# Patient Record
Sex: MALE | Race: WHITE | Employment: FULL TIME | URBAN - METROPOLITAN AREA
[De-identification: names, ages, dates, MRNs, and addresses within clinical notes are randomized per-mention and may not be internally consistent; named-entity substitution may affect disease eponyms.]

---

## 2019-08-09 ENCOUNTER — TELEPHONE (OUTPATIENT)
Dept: GASTROENTEROLOGY | Age: 59
End: 2019-08-09

## 2019-08-09 DIAGNOSIS — Z12.11 COLON CANCER SCREENING: Primary | ICD-10-CM

## 2019-08-09 RX ORDER — SODIUM, POTASSIUM,MAG SULFATES 17.5-3.13G
SOLUTION, RECONSTITUTED, ORAL ORAL
Qty: 2 BOTTLE | Refills: 0 | Status: ON HOLD | OUTPATIENT
Start: 2019-08-09 | End: 2019-08-26 | Stop reason: HOSPADM

## 2019-08-19 RX ORDER — GLIMEPIRIDE 4 MG/1
4 TABLET ORAL 2 TIMES DAILY
COMMUNITY

## 2019-08-19 RX ORDER — AMLODIPINE BESYLATE 5 MG/1
5 TABLET ORAL DAILY
COMMUNITY

## 2019-08-19 RX ORDER — IBUPROFEN 800 MG/1
800 TABLET ORAL EVERY 6 HOURS PRN
COMMUNITY

## 2019-08-19 RX ORDER — LOSARTAN POTASSIUM 100 MG/1
100 TABLET ORAL DAILY
COMMUNITY

## 2019-08-19 RX ORDER — SIMVASTATIN 20 MG
20 TABLET ORAL DAILY
COMMUNITY

## 2019-08-26 ENCOUNTER — HOSPITAL ENCOUNTER (OUTPATIENT)
Age: 59
Setting detail: OUTPATIENT SURGERY
Discharge: HOME | End: 2019-08-26
Attending: INTERNAL MEDICINE | Admitting: INTERNAL MEDICINE
Payer: COMMERCIAL

## 2019-08-26 ENCOUNTER — ANESTHESIA (OUTPATIENT)
Dept: OPERATING ROOM | Age: 59
End: 2019-08-26
Payer: COMMERCIAL

## 2019-08-26 ENCOUNTER — ANESTHESIA EVENT (OUTPATIENT)
Dept: OPERATING ROOM | Age: 59
End: 2019-08-26
Payer: COMMERCIAL

## 2019-08-26 VITALS
SYSTOLIC BLOOD PRESSURE: 103 MMHG | OXYGEN SATURATION: 99 % | DIASTOLIC BLOOD PRESSURE: 60 MMHG | RESPIRATION RATE: 17 BRPM

## 2019-08-26 VITALS
DIASTOLIC BLOOD PRESSURE: 85 MMHG | TEMPERATURE: 97.6 F | OXYGEN SATURATION: 100 % | SYSTOLIC BLOOD PRESSURE: 127 MMHG | HEART RATE: 76 BPM | RESPIRATION RATE: 16 BRPM | BODY MASS INDEX: 35.03 KG/M2 | HEIGHT: 66 IN | WEIGHT: 218 LBS

## 2019-08-26 LAB — GLUCOSE BLD-MCNC: 218 MG/DL (ref 74–100)

## 2019-08-26 PROCEDURE — 2709999900 HC NON-CHARGEABLE SUPPLY: Performed by: INTERNAL MEDICINE

## 2019-08-26 PROCEDURE — 88305 TISSUE EXAM BY PATHOLOGIST: CPT

## 2019-08-26 PROCEDURE — 3700000000 HC ANESTHESIA ATTENDED CARE: Performed by: INTERNAL MEDICINE

## 2019-08-26 PROCEDURE — 45385 COLONOSCOPY W/LESION REMOVAL: CPT | Performed by: INTERNAL MEDICINE

## 2019-08-26 PROCEDURE — 82947 ASSAY GLUCOSE BLOOD QUANT: CPT

## 2019-08-26 PROCEDURE — 7100000010 HC PHASE II RECOVERY - FIRST 15 MIN: Performed by: INTERNAL MEDICINE

## 2019-08-26 PROCEDURE — 2720000010 HC SURG SUPPLY STERILE: Performed by: INTERNAL MEDICINE

## 2019-08-26 PROCEDURE — 3700000001 HC ADD 15 MINUTES (ANESTHESIA): Performed by: INTERNAL MEDICINE

## 2019-08-26 PROCEDURE — 2580000003 HC RX 258: Performed by: INTERNAL MEDICINE

## 2019-08-26 PROCEDURE — 7100000011 HC PHASE II RECOVERY - ADDTL 15 MIN: Performed by: INTERNAL MEDICINE

## 2019-08-26 PROCEDURE — 6360000002 HC RX W HCPCS: Performed by: NURSE ANESTHETIST, CERTIFIED REGISTERED

## 2019-08-26 PROCEDURE — 3609010600 HC COLONOSCOPY POLYPECTOMY SNARE/COLD BIOPSY: Performed by: INTERNAL MEDICINE

## 2019-08-26 PROCEDURE — 2500000003 HC RX 250 WO HCPCS: Performed by: NURSE ANESTHETIST, CERTIFIED REGISTERED

## 2019-08-26 RX ORDER — EPHEDRINE SULFATE/0.9% NACL/PF 50 MG/5 ML
SYRINGE (ML) INTRAVENOUS PRN
Status: DISCONTINUED | OUTPATIENT
Start: 2019-08-26 | End: 2019-08-26 | Stop reason: SDUPTHER

## 2019-08-26 RX ORDER — PROPOFOL 10 MG/ML
INJECTION, EMULSION INTRAVENOUS CONTINUOUS PRN
Status: DISCONTINUED | OUTPATIENT
Start: 2019-08-26 | End: 2019-08-26 | Stop reason: SDUPTHER

## 2019-08-26 RX ORDER — PROPOFOL 10 MG/ML
INJECTION, EMULSION INTRAVENOUS PRN
Status: DISCONTINUED | OUTPATIENT
Start: 2019-08-26 | End: 2019-08-26 | Stop reason: SDUPTHER

## 2019-08-26 RX ORDER — SODIUM CHLORIDE, SODIUM LACTATE, POTASSIUM CHLORIDE, CALCIUM CHLORIDE 600; 310; 30; 20 MG/100ML; MG/100ML; MG/100ML; MG/100ML
INJECTION, SOLUTION INTRAVENOUS CONTINUOUS
Status: DISCONTINUED | OUTPATIENT
Start: 2019-08-26 | End: 2019-08-26 | Stop reason: HOSPADM

## 2019-08-26 RX ORDER — LIDOCAINE HYDROCHLORIDE 20 MG/ML
INJECTION, SOLUTION INFILTRATION; PERINEURAL PRN
Status: DISCONTINUED | OUTPATIENT
Start: 2019-08-26 | End: 2019-08-26 | Stop reason: SDUPTHER

## 2019-08-26 RX ADMIN — PHENYLEPHRINE HYDROCHLORIDE 100 MCG: 10 INJECTION INTRAVENOUS at 14:29

## 2019-08-26 RX ADMIN — PROPOFOL 250 MCG/KG/MIN: 10 INJECTION, EMULSION INTRAVENOUS at 14:16

## 2019-08-26 RX ADMIN — PHENYLEPHRINE HYDROCHLORIDE 50 MCG: 10 INJECTION INTRAVENOUS at 14:23

## 2019-08-26 RX ADMIN — PROPOFOL 50 MG: 10 INJECTION, EMULSION INTRAVENOUS at 14:52

## 2019-08-26 RX ADMIN — PHENYLEPHRINE HYDROCHLORIDE 100 MCG: 10 INJECTION INTRAVENOUS at 14:26

## 2019-08-26 RX ADMIN — PHENYLEPHRINE HYDROCHLORIDE 50 MCG: 10 INJECTION INTRAVENOUS at 14:22

## 2019-08-26 RX ADMIN — Medication 10 MG: at 14:54

## 2019-08-26 RX ADMIN — LIDOCAINE HYDROCHLORIDE 5 ML: 20 INJECTION, SOLUTION INFILTRATION; PERINEURAL at 14:16

## 2019-08-26 RX ADMIN — PHENYLEPHRINE HYDROCHLORIDE 100 MCG: 10 INJECTION INTRAVENOUS at 14:33

## 2019-08-26 RX ADMIN — Medication 10 MG: at 15:04

## 2019-08-26 RX ADMIN — PROPOFOL 50 MG: 10 INJECTION, EMULSION INTRAVENOUS at 14:49

## 2019-08-26 RX ADMIN — Medication 15 MG: at 14:39

## 2019-08-26 RX ADMIN — PROPOFOL 50 MG: 10 INJECTION, EMULSION INTRAVENOUS at 14:55

## 2019-08-26 RX ADMIN — Medication 10 MG: at 14:43

## 2019-08-26 RX ADMIN — SODIUM CHLORIDE, POTASSIUM CHLORIDE, SODIUM LACTATE AND CALCIUM CHLORIDE: 600; 310; 30; 20 INJECTION, SOLUTION INTRAVENOUS at 13:44

## 2019-08-26 ASSESSMENT — PAIN - FUNCTIONAL ASSESSMENT: PAIN_FUNCTIONAL_ASSESSMENT: 0-10

## 2019-08-26 ASSESSMENT — PAIN SCALES - GENERAL
PAINLEVEL_OUTOF10: 0
PAINLEVEL_OUTOF10: 0

## 2019-08-26 ASSESSMENT — LIFESTYLE VARIABLES: SMOKING_STATUS: 0

## 2019-08-26 ASSESSMENT — COPD QUESTIONNAIRES: CAT_SEVERITY: MILD

## 2019-08-26 NOTE — ANESTHESIA PRE PROCEDURE
current smoker                           Cardiovascular:  Exercise tolerance: good (>4 METS),   (+) hypertension:, hyperlipidemia        Rhythm: regular  Rate: normal           Beta Blocker:  Not on Beta Blocker         Neuro/Psych:                ROS comment: Hx of neck surgery  GI/Hepatic/Renal:   (+) GERD: poorly controlled, bowel prep,          ROS comment: Colon cancer screening . Endo/Other:    (+) DiabetesType II DM, , : arthritis (ankles, knees,): OA., .                 Abdominal:   (+) obese,     Abdomen: soft. Vascular: negative vascular ROS. Anesthesia Plan      general and TIVA     ASA 2       Induction: intravenous. Anesthetic plan and risks discussed with patient and spouse.                       JOHN Dye - CRNA   8/26/2019

## 2019-08-26 NOTE — OP NOTE
PROCEDURE NOTE    DATE OF PROCEDURE: 8/26/2019    ENDOSCOPIST: Lashonda Kauffman. Dayna Cabrera MD, Chikis Croft    ASSISTANT: None    PREOPERATIVE DIAGNOSIS: screening for colon cancer    POSTOPERATIVE DIAGNOSIS: polyp(s) #1, 13 mm in size, located in the sigmoid removed with snare cautery after prophylactic clipping of the stalk and recovered for histology with a Feng net  #2, 3 mm in size, located in the sigmoid removed by cold snare and retrieved for pathology  #3, 5-6 mm in size, located in the sigmoid removed by snare cautery and retrieved for pathology    OPERATION: Colonoscopy with polypectomy (cold snare), polypectomy (snare cautery)    ANESTHESIA: MAC     ESTIMATED BLOOD LOSS: Minimal    COMPLICATIONS: None. SPECIMENS: were obtained    HISTORY: The patient is a 62y.o. year old male with history of above preop diagnosis. Colonoscopy with possible biopsy or polypectomy has been recommended and I explained the risk, benefits, expected outcome, and alternatives to the procedure. Risks include but are not limited to bleeding, infection, respiratory distress, hypotension, and perforation of the colon. The patient understands and is in agreement. PROCEDURE: The patient was given monitored anesthesia care. The patient was given oxygen by nasal cannula. The colonoscope was inserted per rectum and advanced under direct vision to the cecum without difficulty. Findings:  Cecum/Ascending colon: normal    Transverse colon: normal    Descending/Sigmoid colon: abnormal: Polyps (2) removed with snare cautery and recovered for histology with the larger one requiring a Feng net, smaller polyp removed with cold snare and recovered for histology    Rectum/Anus: examined in normal and retroflexed positions and was normal    The colon was decompressed and the scope was removed. The patient tolerated the procedure well.        Electronically signed by Lor Muhammad MD  on 8/26/2019 at 3:11 PM

## 2019-08-28 LAB — SURGICAL PATHOLOGY REPORT: NORMAL

## 2019-09-22 PROBLEM — Z12.11 COLON CANCER SCREENING: Status: RESOLVED | Noted: 2019-08-09 | Resolved: 2019-09-22

## 2020-06-18 ENCOUNTER — HOSPITAL ENCOUNTER (OUTPATIENT)
Age: 60
Setting detail: SPECIMEN
Discharge: HOME | End: 2020-06-18
Payer: COMMERCIAL

## 2020-06-18 PROCEDURE — U0003 INFECTIOUS AGENT DETECTION BY NUCLEIC ACID (DNA OR RNA); SEVERE ACUTE RESPIRATORY SYNDROME CORONAVIRUS 2 (SARS-COV-2) (CORONAVIRUS DISEASE [COVID-19]), AMPLIFIED PROBE TECHNIQUE, MAKING USE OF HIGH THROUGHPUT TECHNOLOGIES AS DESCRIBED BY CMS-2020-01-R: HCPCS

## 2020-06-22 LAB — SARS-COV-2, NAA: NOT DETECTED

## 2020-06-23 ENCOUNTER — TELEPHONE (OUTPATIENT)
Dept: PRIMARY CARE CLINIC | Age: 60
End: 2020-06-23

## 2021-07-01 ENCOUNTER — HOSPITAL ENCOUNTER (OUTPATIENT)
Dept: DIABETES SERVICES | Age: 61
Setting detail: THERAPIES SERIES
Discharge: HOME | End: 2021-07-01
Payer: COMMERCIAL

## 2021-07-01 PROCEDURE — G0108 DIAB MANAGE TRN  PER INDIV: HCPCS

## 2021-07-01 SDOH — ECONOMIC STABILITY: FOOD INSECURITY: ADDITIONAL INFORMATION: NO

## 2021-07-01 NOTE — PROGRESS NOTES
Highly encourage milk consumption to be decreased. Consider a glass (8 oz) with supper and could include another at 8 oz at bedtime. He does the shopping and wife does the cooking. He describes his job as being active but does not participate in planned physical activity. Discuss benefits of exercise and identify barriers. Offer You Tube as an option for exercises, walking, swimming etc.  Encourage them to perform this together for support and accountability. No particular exercise selected at this time but will consider adding this in. Encourage him to not sit for more than 60 minutes. Discuss calling and scheduling eye exam since he has not had one since 2019. He and wife are willing to attend group education classes and will schedule dietitian appointment. All questions answered and office number is given to call with questions or concerns.     Participant Name: Mir Pierre   Referring Provider:  Alex Hauser MD    Keys to learning:  Considerations: []Language []Emotional []Health Literacy  []Cognitive []Memory changes []Financial []Cultural   []Congregation []Vision []Hearing  []Speech []Lack of desire  []Literacy  []Psycho-social  [x]None [] Covid-19 group restrictions  If considerations are noted, accommodations made:     Identified barriers to learning/self management: works full time    The following information was discussed:    [] Diabetes disease process and treatment options   [x] Healthy nutrition, carbohydrate counting, meal planning  [x] Monitoring blood glucose and other parameters; interpreting and using results  [] Acute complications--prevention, detection and treatment  [] Medication management and safety   [x] Incorporating physical activity into lifestyle   [x] Exercise for Health, Reducing Risks for Heart Disease, Diabetes and Heart Health  [] Preventing, through risk reduction behaviors, detecting, and treating chronic complications  [] Sick Day management  [x] Developing [x] Good [] Fair  [] Poor                  Session  Topics & Learning Objectives:      Comments:   Diabetes disease process & Treatment process: Define diabetes & prediabetes; identify own type of diabetes; role of the pancreas; signs/symptoms; diagnostic criteria; prevention and treatment options; contributing factors. Rating  [x] 1  [] 2  [] 3  [] 4      [] NC  [] N/A   Rating  [] 1  [] 2  [] 3  [] 4  [] NC  [] N/A     Rating  [] 1  [] 2  [] 3  [] 4  [] NC  [] N/A     Rating  [] 1  [] 2  [] 3  [] 4  [] NC  [] N/A           Incorporating nutritional management into lifestyle: Describe effect of type, amount & timing of food on blood glucose; Describe basic meal planning techniques & current nutrition guidelines; Correctly read food labels & demonstrate CHO counting & portion control with personalized meal plan. Rating  [x] 1  [] 2  [] 3  [] 4  [] NC  [] N/A     Rating  [] 1  [] 2  [] 3  [] 4  [] NC  [] N/A     Rating  [] 1  [] 2  [] 3  [] 4  [] NC  [] N/A     Rating  [] 1  [] 2  [] 3  [] 4  [] NC  [] N/A                 Incorporating physical activity into lifestyle:   State effect of exercise on blood glucose levels. Identifies personal exercise plan and contraindications. Discussed safety tips while exercising. Rating  [x] 1  [] 2  [] 3  [] 4  [] NC  [] N/A     Rating  [] 1  [] 2  [] 3  [] 4  [] NC  [] N/A       Rating  [] 1  [] 2  [] 3  [] 4  [] NC  [] N/A     Rating  [] 1  [] 2  [] 3  [] 4  [] NC  [] N/A           Using medications safely: State effect of diabetes medicines on diabetes;   Name diabetes medication taking, action, timing & side effects.    Rating  [x] 1  [] 2  [] 3  [] 4  [] NC  [] N/A       Rating  [] 1  [] 2  [] 3  [] 4  [] NC  [] N/A         Rating  [] 1  [] 2  [] 3  [] 4  [] NC  [] N/A   Rating  [] 1  [] 2  [] 3  [] 4  [] NC  [] N/A      ________             Insulin/injectable-  Appropriate injection site; proper storage; proper technique; safe needle disposal.   Rating  [x] 1  [] 2  [] 3  [] 4  [] NC  [] N/A Rating  [] 1  [] 2  [] 3  [] 4  [] NC  [] N/A Rating  [] 1  [] 2  [] 3  [] 4  [] NC  [] N/A Rating  [] 1  [] 2  [] 3  [] 4  [] NC  [] N/A        Monitoring blood glucose, interpreting and using results: Identify recommended blood glucose targets, personal targets, A1C target, importance of logging glucose,appropriate techniques and problem solving. Safe lancet disposal.    Rating  [x] 1  [] 2  [] 3  [] 4  [] NC  [] N/A     Rating  [] 1  [] 2  [] 3  [] 4  [] NC  [] N/A       Rating  [] 1  [] 2  [] 3  [] 4  [] NC  [] N/A     Rating  [] 1  [] 2  [] 3  [] 4  [] NC  [] N/A         Prevention, detection & treatment of acute complications: List symptoms of hyper- and hypoglycemia; Describe how to treat low blood sugar & actions for lowering high blood glucose levels. Prevention and treatment strategies. Rating  [x] 1  [] 2  [] 3  [] 4  [] NC  [] N/A   Rating  [] 1  [] 2  [] 3  [] 4  [] NC  [] N/A   Rating  [] 1  [] 2  [] 3  [] 4  [] NC  [] N/A Rating  [] 1  [] 2  [] 3  [] 4  [] NC  [] N/A                   Describe sick day guidelines and indications for physician notification. Rating  [x] 1  [] 2  [] 3  [] 4  [] NC  [] N/A     Rating  [] 1  [] 2  [] 3  [] 4  [] NC  [] N/A     Rating  [] 1  [] 2  [] 3  [] 4  [] NC  [] N/A     Rating  [] 1  [] 2  [] 3  [] 4  [] NC  [] N/A        Prevention, detection & treatment of chronic complications: Define the natural course of diabetes & describe the relationship of blood glucose levels to long term complications of diabetes. Identify preventative measures and standard of care. Rating  [x] 1  [] 2  [] 3  [] 4  [] NC  [] N/A     Rating  [] 1  [] 2  [] 3  [] 4  [] NC  [] N/A     Rating  [] 1  [] 2  [] 3  [] 4  [] NC  [] N/A     Rating  [] 1  [] 2  [] 3  [] 4  [] NC  [] N/A      Developing strategies to address psychosocial issues: Describe feelings about living with diabetes; Identify support needed & support network. Describe how stress, depression, and anxiety affect blood glucose. Identify coping strategies. Rating  [x] 1  [] 2  [] 3  [] 4  [] NC  [] N/A       Rating  [] 1  [] 2  [] 3  [] 4  [] NC  [] N/A     Rating  [] 1  [] 2  [] 3  [] 4  [] NC  [] N/A     Rating  [] 1  [] 2  [] 3  [] 4  [] NC  [] N/A          Developing strategies to promote health/change behavior:  Define the ABCs of diabetes; Identify appropriate screenings, schedule & personal plan for screenings. Identify 7 self-care behaviors. Benefits, challenges and strategies for behavioral change. Rating  [x] 1  [] 2  [] 3  [] 4  [] NC  [] N/A     Rating  [] 1  [] 2  [] 3  [] 4  [] NC  [] N/A     Rating  [] 1  [] 2  [] 3  [] 4  [] NC  [] N/A     Rating  [] 1  [] 2  [] 3  [] 4  [] NC  [] N/A             Time spent with patient: [id number]    Next Appointment: 2 weeks     Instruction Method: [x]Lecture/Discussion  []Power Point Presentation  [x]Handouts  []Return Demonstration     Education Materials/Equipment Provided:      [x]Self-Management - Initial assessment - ADA  Where do I Begin? Living with Type 2 diabetes\" booklet;  Diet meal planning basics, handout on diabetes education classes, hyper/hypoglycemia signs/symptoms and treatment handout; Diabetes zones; Support plan resource list.      []Self-Management  Class 1 - Diabetes: Your Take Control Guide\" booklet. Healthy Interactions [facility] \"On The Road To Better Managing Your Diabetes\". [] Self-Management  Class 2 -  \"Traveling with Diabetes\", Dining Out With Diabetes, \"Coping with Diabetes\", \"Diabetes Disaster Planning\", \"Know Your Numbers/Diabetes Care Checklist\", [address] [place] [address] Blood Sugar, Low Blood Sugar. Healthy Interactions [facility] \"Monitoring Your Blood Glucose. \"     [] Self-Management  Class 3 - \"Risk Factors for CVD\", foot care tips sheet and \"How to pick the right shoe\", \"Medications Used To Treat Diabetes\", How To Care For Your Teeth and Gums\", \"Vaccinations For Adults with Diabetes\",

## 2021-07-02 RX ORDER — FLUTICASONE PROPIONATE 50 MCG
1 SPRAY, SUSPENSION (ML) NASAL 2 TIMES DAILY
COMMUNITY

## 2021-07-02 RX ORDER — MONTELUKAST SODIUM 10 MG/1
10 TABLET ORAL NIGHTLY
COMMUNITY

## 2021-07-02 RX ORDER — ALBUTEROL SULFATE 90 UG/1
2 AEROSOL, METERED RESPIRATORY (INHALATION) 3 TIMES DAILY PRN
COMMUNITY

## 2021-07-14 ENCOUNTER — HOSPITAL ENCOUNTER (OUTPATIENT)
Dept: DIABETES SERVICES | Age: 61
Setting detail: THERAPIES SERIES
Discharge: HOME | End: 2021-07-14
Payer: COMMERCIAL

## 2021-07-14 PROCEDURE — G0109 DIAB MANAGE TRN IND/GROUP: HCPCS

## 2021-07-21 ENCOUNTER — HOSPITAL ENCOUNTER (OUTPATIENT)
Dept: DIABETES SERVICES | Age: 61
Setting detail: THERAPIES SERIES
Discharge: HOME | End: 2021-07-21
Payer: COMMERCIAL

## 2021-07-21 PROCEDURE — G0109 DIAB MANAGE TRN IND/GROUP: HCPCS

## 2021-07-22 NOTE — PROGRESS NOTES
Diabetes Self-Management Education Record      Progress Note:  Patient arrived to class one with wife in attendance who also has Type 2 diabetes. He is seeing a gradual decline in blood glucose. Most readings are below 200 with occasional elevation post prandial in the 200's but is usually able to identify poor food choices or amount of food as cause. He is eating regular meals and has found adding carb and protein to HS snack has decreased his fasting glucose. Denies any symptoms of hypoglycemia. Actively engaged in walking and working on increasing the length. [facility] 2 times [facility] and goal is 5 times. The following topics were discussed at today's class with active participation and engagement:  Conversation map  [facility] Blood Glucose\" utilized.   Topics:  What blood glucose and insulin are  Blood glucose targets and how you feel when your blood glucose is in and out of your target ranges  Monitoring and knowing your A1C  What can make blood glucose go up and down and preventing high and low blood glucose  Using your monitoring results to manage your diabetes  Sick days with diabetes  Dining out and special events  Diabetes disaster planning    Participant Name: Narayan Mccoy   Referring Provider:  Idalia Groves MD    Keys to learning:  Considerations: []Language []Emotional []Health Literacy  []Cognitive []Memory changes []Financial []Cultural   []Taoist []Vision []Hearing  []Speech []Lack of desire  []Literacy  []Psycho-social  [x]None [] Covid-19 group restrictions  If considerations are noted, accommodations made:     Identified barriers to learning/self management: works full time    The following information was discussed:    [x] Diabetes disease process and treatment options   [] Healthy nutrition, carbohydrate counting, meal planning  [x] Monitoring blood glucose and other parameters; interpreting and using results  [x] Acute complications--prevention, detection and treatment  [] Medication management and safety   [x] Incorporating physical activity into lifestyle   [x] Exercise for Health, Reducing Risks for Heart Disease, Diabetes and Heart Health  [] Preventing, through risk reduction behaviors, detecting, and treating chronic complications  [x] Sick Day management  [] Developing personalized strategies to address psychosocial issues and concerns  [x] Developing personalized strategies to promote health and behavior change through goalsetting, behavior change strategies aimed at risk reduction  [x] Special situations--disaster planning, travel, social activities  [] Foot, skin, and dental care    Session Assessment & Evaluation Ratings:  1=Needs Instruction  2=Needs Review  3=Comprehends Key Points  4=Demonstrates Understanding/Competency  NC=Not Covered   N/A=Not Applicable Initial  Assess    Date:  07/01/21 2nd  Visit     Date:  07/14/21 3rd  Visit    Date:  07/21/21 4th  Visit    Date: Comments  S.O.C=Stage of Change/Readiness to change:  Pre=Pre-contemplation stage--not thinking about changing  C=Contemplation stage--ambivalent about changing  P=Preparation stage--prepared to made a specific change  A=Action stage--committed to modify behaviors  M=Maintenance and relapse prevention--incorporating new behavior   Participant Stated  Goal      I will eat more consistent and not go more than 5 hours without eating. Participant Stated Goal  I will consider adding exercise to daily routine and select an activity by next appointment     S. O.C  [] PRE  [x] C  [] P      [] A  [] M                    S.O.C  [x] PRE  [] C  [] P      [] A  [] M     S.O.C  [] PRE  [x] C  [] P      [] A  [] M                     S.O.C  [] PRE  [x] C  [] P      [] A  [] M      S.O.C  [] PRE  [] C  [x] P      [] A  [] M                    S.O.C  [] PRE  [] C  [x] P      [] A  [] M     S.O.C  [] PRE  [] C  [] P      [] A  [] M                    S.O.C  [] PRE  [] C  [] P      [] levels. Identifies personal exercise plan and contraindications. Discussed safety tips while exercising. Rating  [x] 1  [] 2  [] 3  [] 4  [] NC  [] N/A     Rating  [] 1  [x] 2  [] 3  [] 4  [] NC  [] N/A       Rating  [] 1  [x] 2  [] 3  [] 4  [] NC  [] N/A     Rating  [] 1  [] 2  [] 3  [] 4  [] NC  [] N/A      07/21/21-Currently walking 2 times around [facility] and goal is 5 laps. Identifies decrease in glucose with adding exercise. Using medications safely: State effect of diabetes medicines on diabetes;   Name diabetes medication taking, action, timing & side effects. Rating  [x] 1  [] 2  [] 3  [] 4  [] NC  [] N/A       Rating  [x] 1  [] 2  [] 3  [] 4  [] NC  [] N/A         Rating  [x] 1  [] 2  [] 3  [] 4  [] NC  [] N/A   Rating  [] 1  [] 2  [] 3  [] 4  [] NC  [] N/A      ________             Insulin/injectable-  Appropriate injection site; proper storage; proper technique; safe needle disposal.   Rating  [x] 1  [] 2  [] 3  [] 4  [] NC  [] N/A Rating  [x] 1  [] 2  [] 3  [] 4  [] NC  [] N/A Rating  [x] 1  [] 2  [] 3  [] 4  [] NC  [] N/A Rating  [] 1  [] 2  [] 3  [] 4  [] NC  [] N/A        Monitoring blood glucose, interpreting and using results: Identify recommended blood glucose targets, personal targets, A1C target, importance of logging glucose,appropriate techniques and problem solving. Safe lancet disposal.    Rating  [x] 1  [] 2  [] 3  [] 4  [] NC  [] N/A     Rating  [x] 1  [] 2  [] 3  [] 4  [] NC  [] N/A       Rating  [] 1  [] 2  [x] 3  [] 4  [] NC  [] N/A     Rating  [] 1  [] 2  [] 3  [] 4  [] NC  [] N/A      07/21/21-Verbalizes appropriate target range for glucose monitoring. Able to begin problem solving and identifying why glucose is higher and lower. Prevention, detection & treatment of acute complications: List symptoms of hyper- and hypoglycemia; Describe how to treat low blood sugar & actions for lowering high blood glucose levels. Prevention and treatment strategies. Rating  [x] 1  [] 2  [] 3  [] 4  [] NC  [] N/A   Rating  [x] 1  [] 2  [] 3  [] 4  [] NC  [] N/A   Rating  [] 1  [] 2  [x] 3  [] 4  [] NC  [] N/A Rating  [] 1  [] 2  [] 3  [] 4  [] NC  [] N/A    0721-States signs and symptoms of hyper and hypo. Identifies causes of each and learning to recognize his symptoms of each. Continues to use monitoring to evaluate changes he is making and continue behavior change. Describe sick day guidelines and indications for physician notification. Rating  [x] 1  [] 2  [] 3  [] 4  [] NC  [] N/A     Rating  [x] 1  [] 2  [] 3  [] 4  [] NC  [] N/A     Rating  [] 1  [] 2  [x] 3  [] 4  [] NC  [] N/A     Rating  [] 1  [] 2  [] 3  [] 4  [] NC  [] N/A     07/21-handout given and reviewed. States effect of illness on diabetes and need for medication to be continued, alter meal plan if needed, and consult with pharmacist regarding OTC medication. Prevention, detection & treatment of chronic complications: Define the natural course of diabetes & describe the relationship of blood glucose levels to long term complications of diabetes. Identify preventative measures and standard of care. Rating  [x] 1  [] 2  [] 3  [] 4  [] NC  [] N/A     Rating  [x] 1  [] 2  [] 3  [] 4  [] NC  [] N/A     Rating  [x] 1  [] 2  [] 3  [] 4  [] NC  [] N/A     Rating  [] 1  [] 2  [] 3  [] 4  [] NC  [] N/A      Developing strategies to address psychosocial issues: Describe feelings about living with diabetes; Identify support needed & support network. Describe how stress, depression, and anxiety affect blood glucose. Identify coping strategies. Rating  [x] 1  [] 2  [] 3  [] 4  [] NC  [] N/A       Rating  [] 1  [x] 2  [] 3  [] 4  [] NC  [] N/A     Rating  [] 1  [] 2  [x] 3  [] 4  [] NC  [] N/A     Rating  [] 1  [] 2  [] 3  [] 4  [] NC  [] N/A       07/14/21-Verbalizes feelings of being overwhelmed, frustrated, and in denial.  Wife is supportive.    Developing strategies to promote health/change People With Diabetes   [] Other       Diabetes Self Management Support Plan: To assist and support your continued progress in managing your diabetes following education-    ( X )  Gym or exercise program of your choice. (Suggestions:  [employer], Circuit training, any exercise facility and your local Physical Therapy or Cardiac Rehabilitation exercise Program)      (  )   Library    (  )    ADA website:  [employer]ca. org    (  )   Http: [employer]so[employer]    (  )   Diabetes Forecast [place] you may get this information on the ADA web site.     (  )  Diabetes Interview - [place]   [phone]    (  )  Diabetes Self Management (bi-monthly magazine)  [phone]    (  ) Support group: (  ) Support group: Claudia first Tuesday of the month at 9 am and Toshia Pnoce third Wednesday of the month at 9 am    (  )  Health Journeys [employer]  (relaxation tapes for people with Diabetes)  [phone]    Derek Grate  )  Your suggestions:   Ophthalmology Consult                     Jhony Rivera RN, BSN, Sandra Husbands

## 2021-07-28 ENCOUNTER — HOSPITAL ENCOUNTER (OUTPATIENT)
Dept: NUTRITION | Age: 61
Discharge: HOME | End: 2021-07-28
Payer: COMMERCIAL

## 2021-07-28 PROCEDURE — 97804 MEDICAL NUTRITION GROUP: CPT

## 2021-07-28 NOTE — PROGRESS NOTES
MNT provided for Diabetes, as part of Comprehensive Diabetes Education, via classroom environment. Altered nutrition-related lab values: A1c related to Organ/system dysfunction: diabetes as evidenced by Lab values: A1c 11.7    Client data    Ht: 5'6\" Wt: 218# BMI: 35.19 (35-39.9 - Obesity Grade II)   Weight changes: 11# weight loss CBW: 98.9 kg   BMR: 1688 calories Est. total calorie needs: ~8633-9452     Client overall goal for weight is for weight loss trend towards a healthier BMI. Current eating pattern is consistent in meal timing. Client presents today with wife Vivian Zayas for support. Client was provided with carbohydrate counting goals for meals and snacks:  Breakfast: 60 grams  Lunch: 45 grams  Supper: 60 grams  Bedtime Snack: 15 grams    Client received information on limiting fat in diet to lessen heart disease risk, with goals of: Total Fat: 56 grams  Saturated Fat: 11 grams  Trans Fat: 0 grams daily     Client received information materials including the Choose Your Foods for Meal Planning, Food Label reading, Between meal snack ideas, 1500 sample 7 day meal plan, refrigerator paper, MyPlate and personal carbohydrate goals. Utilized Diabetes and Healthy Eating Conversation Map as tool, facilitated by this writer. Topics covered were:  1. The relationship between blood glucose and food  2. Feelings about food and eating (likes, dislikes, family, culture and Jewish influences)  3. The nutrients that make up food (food groups and their impact on glucose levels)  4. How what you eat, how much you eat, and when you eat can affect your blood glucose (including struggles of over-eating and how to eat less)  5. Meal planning and other strategies for healthy eating (and not skipping meals)  6.  The importance of having a plan for eating (food label reading, plate method, food diary, shopping list and carb counting explored), and engaging your support network and health care team (identify food \"challenges\"and planning, setting goals and identify support network)    Goals :  Set goals for diabetes care    Choose a method for controlling nutrition intakes (carb count vs plate method)    Understand Total Carbohydrate and Serving Size    Inclusion of whole grains, whole fruits and vegetables    Incorporation of activity daily (5 days a week minimally)    Practice consistent meal timing    Understand how to look up nutrition information for restaurants      Expected compliance:  good. Client was interactive in conversation map. He demonstrated ability to identify areas in own eating pattern that could be improved. Client appears to be in a contemplative phase of change. Recommend follow up as needed. RD name and phone number provided. .    Thank you for the referral.    Electronically signed by Kareem Braxton RD, LD on 7/28/2021 at 4:31 PM    Education session duration: 120 minutes.

## 2021-08-04 ENCOUNTER — HOSPITAL ENCOUNTER (OUTPATIENT)
Dept: DIABETES SERVICES | Age: 61
Setting detail: THERAPIES SERIES
Discharge: HOME | End: 2021-08-04
Payer: COMMERCIAL

## 2021-08-04 PROCEDURE — G0109 DIAB MANAGE TRN IND/GROUP: HCPCS

## 2021-08-05 NOTE — PROGRESS NOTES
Diabetes Self-Management Education Record      Progress Note:  Patient arrived to class three with wife in attendance who also has Type 2 diabetes. He is seeing a gradual decline in blood glucose. Most readings are below 200 with occasional elevation post prandial in the 200's but is usually able to identify poor food choices or amount of food as cause. The following information was discussed:   Conversation map [facility] with Diabetes\" utilized. Topics:   The natural course of diabetes  Recognizing the fact that it may become more difficult to keep your blood glucose within your target range  The potential long-term complications of diabetes  How to delay or reduce the risk of long-term complications by keeping your blood glucose on target  The importance of checking for long-term complications and knowing your ABCs  How oral diabetes medications work  How other diabetes medications work  Defining the different types of insulin           Participant Name: Trey Luis   Referring Provider:  Navneet Murrell MD    Keys to learning:  Considerations: []Language []Emotional []Health Literacy  []Cognitive []Memory changes []Financial []Cultural   []Mosque []Vision []Hearing  []Speech []Lack of desire  []Literacy  []Psycho-social  [x]None [] Covid-19 group restrictions  If considerations are noted, accommodations made:     Identified barriers to learning/self management: works full time    The following information was discussed:    [x] Diabetes disease process and treatment options   [x] Healthy nutrition, carbohydrate counting, meal planning  [] Monitoring blood glucose and other parameters; interpreting and using results  [] Acute complications--prevention, detection and treatment  [x] Medication management and safety   [x] Incorporating physical activity into lifestyle   [x] Exercise for Health, Reducing Risks for Heart Disease, Diabetes and Heart Health  [x] Preventing, through risk reduction behaviors, detecting, and treating chronic complications  [] Sick Day management  [] Developing personalized strategies to address psychosocial issues and concerns  [x] Developing personalized strategies to promote health and behavior change through goalsetting, behavior change strategies aimed at risk reduction  [] Special situations--disaster planning, travel, social activities  [x] Foot, skin, and dental care    Session Assessment & Evaluation Ratings:  1=Needs Instruction  2=Needs Review  3=Comprehends Key Points  4=Demonstrates Understanding/Competency  NC=Not Covered   N/A=Not Applicable Initial  Assess    Date:  07/01/21 2nd  Visit     Date:  07/14/21 3rd  Visit    Date:  07/21/21 4th  Visit    Date:  08/04/21 Comments  S.O.C=Stage of Change/Readiness to change:  Pre=Pre-contemplation stage--not thinking about changing  C=Contemplation stage--ambivalent about changing  P=Preparation stage--prepared to made a specific change  A=Action stage--committed to modify behaviors  M=Maintenance and relapse prevention--incorporating new behavior   Participant Stated  Goal      I will eat more consistent and not go more than 5 hours without eating. Participant Stated Goal  I will consider adding exercise to daily routine and select an activity by next appointment     S. O.C  [] PRE  [x] C  [] P      [] A  [] M                    S.O.C  [x] PRE  [] C  [] P      [] A  [] M     S.O.C  [] PRE  [x] C  [] P      [] A  [] M                     S.O.C  [] PRE  [x] C  [] P      [] A  [] M      S.O.C  [] PRE  [] C  [x] P      [] A  [] M                    S.O.C  [] PRE  [] C  [x] P      [] A  [] M     S.O.C  [] PRE  [] C  [x] P      [] A  [] M                    S.O.C  [] PRE  [] C  [] P      [x] A  [] M   Current main goal attainment frequency:   [] Never  [] Some  [] Half  [x] Most  [] All    Participant confidence to master goal this visit:   [] Excellent  [x] Good  [] Fair  [] Poor            Current main goal attainment frequency:   [] Never  [] Some  [] Half  [x] Most  [] All    Participant confidence to master goal this visit:   [] Excellent  [x] Good [] Fair  [] Poor                  Session  Topics & Learning Objectives:      Comments:   Diabetes disease process & Treatment process: Define diabetes & prediabetes; identify own type of diabetes; role of the pancreas; signs/symptoms; diagnostic criteria; prevention and treatment options; contributing factors. Rating  [x] 1  [] 2  [] 3  [] 4      [] NC  [] N/A   Rating  [] 1  [] 2  [x] 3  [] 4  [] NC  [] N/A     Rating  [] 1  [] 2  [x] 3  [] 4  [] NC  [] N/A     Rating  [] 1  [] 2  [x] 3  [] 4  [] NC  [] N/A      07/14/21-States role of pancreas and liver in glucose control. Verbalizes understanding of insulin resistance and medication and lifestyle are treatment options. 07/21/21-Discussed the 8 core defects in Type 2 diabetes. States a better understanding of diabetes disease process and glucose regulation. Incorporating nutritional management into lifestyle: Describe effect of type, amount & timing of food on blood glucose; Describe basic meal planning techniques & current nutrition guidelines; Correctly read food labels & demonstrate CHO counting & portion control with personalized meal plan. Rating  [x] 1  [] 2  [] 3  [] 4  [] NC  [] N/A     Rating  [] 1  [x] 2  [] 3  [] 4  [] NC  [] N/A     Rating  [] 1  [x] 2  [] 3  [] 4  [] NC  [] N/A     Rating  [] 1  [] 2  [x] 3  [] 4  [] NC  [] N/A        07/21/21-Continues to make changes to eating habits. Sees effect of adding carb and protein and more consistent eating pattern. Incorporating physical activity into lifestyle:   State effect of exercise on blood glucose levels. Identifies personal exercise plan and contraindications. Discussed safety tips while exercising.             Rating  [x] 1  [] 2  [] 3  [] 4  [] NC  [] N/A     Rating  [] 1  [x] 2  [] 3  [] 4  [] NC  [] N/A       Rating  [] 1  [x] 2  [] 3  [] 4  [] NC  [] N/A     Rating  [] 1  [] 2  [x] 3  [] 4  [] NC  [] N/A      07/21/21-Currently walking 2 times around [place] and goal is 5 laps. Identifies decrease in glucose with adding exercise. 08/04/21-Adding more laps each week. Continues to see benefit of activity. Using medications safely: State effect of diabetes medicines on diabetes;   Name diabetes medication taking, action, timing & side effects. Rating  [x] 1  [] 2  [] 3  [] 4  [] NC  [] N/A       Rating  [x] 1  [] 2  [] 3  [] 4  [] NC  [] N/A         Rating  [x] 1  [] 2  [] 3  [] 4  [] NC  [] N/A   Rating  [] 1  [] 2  [x] 3  [] 4  [] NC  [] N/A      ________    08/04/21-States name, action side effects of medication. Verbalizes Glimepiride needs to be taken with food and can cause hypoglycemia. Insulin/injectable-  Appropriate injection site; proper storage; proper technique; safe needle disposal.   Rating  [x] 1  [] 2  [] 3  [] 4  [] NC  [] N/A Rating  [x] 1  [] 2  [] 3  [] 4  [] NC  [] N/A Rating  [x] 1  [] 2  [] 3  [] 4  [] NC  [] N/A Rating  [] 1  [] 2  [x] 3  [] 4  [] NC  [] N/A        Monitoring blood glucose, interpreting and using results: Identify recommended blood glucose targets, personal targets, A1C target, importance of logging glucose,appropriate techniques and problem solving. Safe lancet disposal.    Rating  [x] 1  [] 2  [] 3  [] 4  [] NC  [] N/A     Rating  [x] 1  [] 2  [] 3  [] 4  [] NC  [] N/A       Rating  [] 1  [] 2  [x] 3  [] 4  [] NC  [] N/A     Rating  [] 1  [] 2  [x] 3  [] 4  [] NC  [] N/A      07/21/21-Verbalizes appropriate target range for glucose monitoring. Able to begin problem solving and identifying why glucose is higher and lower. 08/04/21-Most readings below 200. Prevention, detection & treatment of acute complications: List symptoms of hyper- and hypoglycemia; Describe how to treat low blood sugar & actions for lowering high blood glucose levels. Prevention and treatment strategies. Rating  [x] 1  [] 2  [] 3  [] 4  [] NC  [] N/A   Rating  [x] 1  [] 2  [] 3  [] 4  [] NC  [] N/A   Rating  [] 1  [] 2  [x] 3  [] 4  [] NC  [] N/A Rating  [] 1  [] 2  [] 3  [x] 4  [] NC  [] N/A    0721-States signs and symptoms of hyper and hypo. Identifies causes of each and learning to recognize his symptoms of each. Continues to use monitoring to evaluate changes he is making and continue behavior change. Describe sick day guidelines and indications for physician notification. Rating  [x] 1  [] 2  [] 3  [] 4  [] NC  [] N/A     Rating  [x] 1  [] 2  [] 3  [] 4  [] NC  [] N/A     Rating  [] 1  [] 2  [x] 3  [] 4  [] NC  [] N/A     Rating  [] 1  [] 2  [x] 3  [] 4  [] NC  [] N/A     07/21-handout given and reviewed. States effect of illness on diabetes and need for medication to be continued, alter meal plan if needed, and consult with pharmacist regarding OTC medication. Prevention, detection & treatment of chronic complications: Define the natural course of diabetes & describe the relationship of blood glucose levels to long term complications of diabetes. Identify preventative measures and standard of care. Rating  [x] 1  [] 2  [] 3  [] 4  [] NC  [] N/A     Rating  [x] 1  [] 2  [] 3  [] 4  [] NC  [] N/A     Rating  [x] 1  [] 2  [] 3  [] 4  [] NC  [] N/A     Rating  [] 1  [] 2  [x] 3  [] 4  [] NC  [] N/A   08/04/21-Verbalizes treatment may change over time. Identifies preventive exams that need to be completed to reduce risks of complications. Developing strategies to address psychosocial issues: Describe feelings about living with diabetes; Identify support needed & support network. Describe how stress, depression, and anxiety affect blood glucose. Identify coping strategies.  Rating  [x] 1  [] 2  [] 3  [] 4  [] NC  [] N/A       Rating  [] 1  [x] 2  [] 3  [] 4  [] NC  [] N/A     Rating  [] 1  [] 2  [x] 3  [] 4  [] NC  [] N/A     Rating  [] 1  [] 2  [x] 3  [] 4  [] NC  [] N/A       07/14/21-Verbalizes feelings of being overwhelmed, frustrated, and in denial.  Wife is supportive. Developing strategies to promote health/change behavior:  Define the ABCs of diabetes; Identify appropriate screenings, schedule & personal plan for screenings. Identify 7 self-care behaviors. Benefits, challenges and strategies for behavioral change. Rating  [x] 1  [] 2  [] 3  [] 4  [] NC  [] N/A     Rating  [x] 1  [] 2  [] 3  [] 4  [] NC  [] N/A     Rating  [] 1  [x] 2  [] 3  [] 4  [] NC  [] N/A     Rating  [] 1  [] 2  [] 3  [x] 4  [] NC  [] N/A       08/04/21-Verbalizes the ABCs of diabetes. Feeling much more in control and feels he gained the knowledge and tools to control and manage his glucose. Time spent with patient: [phone]    Next Appointment: follow up     Instruction Method: [x]Lecture/Discussion  []Power Point Presentation  [x]Handouts  []Return Demonstration     Education Materials/Equipment Provided:      []Self-Management - Initial assessment - ADA  Where do I Begin? Living with Type 2 diabetes\" booklet;  Diet meal planning basics, handout on diabetes education classes, hyper/hypoglycemia signs/symptoms and treatment handout; Diabetes zones; Support plan resource list.      []Self-Management  Class 1 - Diabetes: Your Take Control Guide\" booklet. Healthy Interactions [facility] \"On The Road To Better Managing Your Diabetes\". [] Self-Management  Class 2 -  \"Traveling with Diabetes\", Dining Out With Diabetes, \"Coping with Diabetes\", \"Diabetes Disaster Planning\", \"Know Your Numbers/Diabetes Care Checklist\", [address] [place] [address] Blood Sugar, Low Blood Sugar. Healthy Interactions Map \"Monitoring Your Blood Glucose. \"     [x] Self-Management  Class 3 - \"Risk Factors for CVD\", foot care tips sheet and \"How to pick the right shoe\", \"Medications Used To Treat Diabetes\", How To Care For Your Teeth and Gums\", \"Vaccinations For Adults with Diabetes\", \"Type 2 diabetes and the role of GLP-1\". Healthy Interactions Map \"Continuing Your Journey\". []Self-Management - 3 month follow-up      []Glucose Meter      []Insulin Kit      []Other        Handouts/Booklets given:     [] Diabetes-Your Take Control Guide   [] Daily Diabetic Meal Planning Guide   [] Nutrition in the [address]    [] Resources for People With Diabetes   [] Other       Diabetes Self Management Support Plan: To assist and support your continued progress in managing your diabetes following education-    ( X )  Gym or exercise program of your choice. (Suggestions:  [employer], Circuit training, any exercise facility and your local Physical Therapy or Cardiac Rehabilitation exercise Program)      (  )   Library    (  )    ADA website:  [employer].ca. org    (  )   Http: [employer].Iterasi    (  )   Diabetes Forecast [place] you may get this information on the ADA web site.     (  )  Diabetes Interview - [place]   [phone]    (  )  Diabetes Self Management (bi-monthly magazine)  [phone]    (  ) Support group: (  ) Support group: Claudia first Tuesday of the month at 9 am and Bianca garcia third Wednesday of the month at 9 am    (  )  Health Journeys [employer] Paths  (relaxation tapes for people with Diabetes)  [phone]    Billie Franks  )  Your suggestions:   Ophthalmology Consult                     Sylvia Tao RN, BSN, [place]

## 2021-12-01 ENCOUNTER — HOSPITAL ENCOUNTER (OUTPATIENT)
Dept: DIABETES SERVICES | Age: 61
Setting detail: THERAPIES SERIES
Discharge: HOME | End: 2021-12-01
Payer: COMMERCIAL

## 2021-12-01 PROCEDURE — G0109 DIAB MANAGE TRN IND/GROUP: HCPCS

## 2021-12-02 NOTE — PROGRESS NOTES
Diabetes Self-Management Program Follow-Up    Name:  Abdias Loera   Follow-Up Date:  12/2/2021   Class Dates:07/01/21,  07/14/21, 07/21/21, 07/28/21, 08/04/21  YOB: 1960   Goal:     I will eat more consistent and not go more than 5 hours without eating. How often did you meet your goal?     [x]All the time (5)   []Most of the time (4)   []Half the time (3)   []Occasionally (2)    []Never (1)  Modify goal:   Goal:  I will consider adding exercise to daily routine and select an activity by next appointment  How often did you meet your goal?     []All the time (5)   []Most of the time (4)   []Half the time (3)   [x]Occasionally (2)    []Never (1)  Modify goal:     No results found for: LABA1C  No results found for: GLUF, LABMICR, LDLCALC, CREATININE    Blood glucose range: 140-160      Physician Appointment (date of most recent or next scheduled): Oct 2021  Recent health problems or change in diabetes medications: Yes -Farxiga added  Been admitted to hospital or ED visit last 4 months? no  Do you know the amount of carbohydrates you eat per meal?   [x]Yes   []No   Frequency of self-foot exam:   [x]Daily  []Other   Eye exam scheduled? []Yes   [x]No  How long ago? How many times a day do you check your blood sugar? [x]1   []2   []3   []4   []more  Have you been exercising since class? []Yes    [x]No   If yes, what kind? How long? If yes, how many days/week? []1   []2   []3   []4   []more  How often do you miss taking your medications? []All the time (5)   []Most of the time (4)   []Half the time (3)   []Occasionally (2)  [x]Never (1)      Diabetes Self Management Support Plan: To assist and support your continued progress in managing your diabetes following    education    (X  )  Gym or exercise program of your choice.            (Suggestions:  [employer], Circuit training, any exercise facility and your local Physical                    Therapy or Cardiac Rehabilitation exercise Program )      (  )   Library      ( X )    ADA website:  [employer].ca. org      (  )   Http: DotProtection.gl      (  )   Diabetes Forecast [place] you may get this information on the ADA web site. (  )  Diabetes Interview - [place]   [phone]      (  )  Diabetes Self Management (bi-monthly magazine)  [phone]      (  )  Health Journeys Image Paths  (relaxation tapes for people with Diabetes)          [phone]    (  )  Diabetes Support Group :  Monthly every first Tuesday of the month at [address] [place] [address] Avenue starts at [employer].  Meetings at [facility]    ( X )  Your suggestions:   Eye Exam      Feelings/Attitudes/Other questions: Abhishek Castaneda was seen for follow up in group setting. He reports his A1C improved some but Pink Kenrick has been added and he is seeing a big improvement in his glucose. Range is 140-160 and he denies any side effects. He has lost 18 lbs. Denies episodes of hypoglycemia. Encourage increase in [place]. Admits increased stress at work. Discuss PA can help relieve and deal with stress. His wife has diabetes as well and presents with him and she has lowered her A1C and lowered her medication dose and eliminated some diabetes medications. Encourage them to keep up the good work and encourage him again to increase PA and schedule eye exam.  Office number given to call with questions or concerns.

## 2022-05-27 ENCOUNTER — TELEPHONE (OUTPATIENT)
Dept: SURGERY | Age: 62
End: 2022-05-27

## 2022-05-27 DIAGNOSIS — Z12.11 SCREENING FOR MALIGNANT NEOPLASM OF COLON: Primary | ICD-10-CM

## 2022-05-27 NOTE — TELEPHONE ENCOUNTER
Spoke with patient informing that 5/31/22 appointment with Dr. Amalia Peck cancelled. Patient is aggreable to see Dr. Magaly Parker, patient aware she does not start until July. Referral sent.

## 2022-05-27 NOTE — TELEPHONE ENCOUNTER
FATOUMATA for patient to return call regarding colonoscopy and getting referral to GI - Dr. Viral Miller

## 2022-08-15 ENCOUNTER — TELEPHONE (OUTPATIENT)
Dept: GASTROENTEROLOGY | Age: 62
End: 2022-08-15

## 2022-08-15 ENCOUNTER — OFFICE VISIT (OUTPATIENT)
Dept: GASTROENTEROLOGY | Age: 62
End: 2022-08-15
Payer: COMMERCIAL

## 2022-08-15 VITALS
HEART RATE: 65 BPM | DIASTOLIC BLOOD PRESSURE: 72 MMHG | RESPIRATION RATE: 20 BRPM | SYSTOLIC BLOOD PRESSURE: 108 MMHG | TEMPERATURE: 98.5 F | HEIGHT: 66 IN | WEIGHT: 199.7 LBS | BODY MASS INDEX: 32.09 KG/M2

## 2022-08-15 DIAGNOSIS — Z12.11 COLON CANCER SCREENING: Primary | ICD-10-CM

## 2022-08-15 DIAGNOSIS — Z01.818 PRE-OP TESTING: Primary | ICD-10-CM

## 2022-08-15 PROCEDURE — 99202 OFFICE O/P NEW SF 15 MIN: CPT | Performed by: INTERNAL MEDICINE

## 2022-08-15 PROCEDURE — 3017F COLORECTAL CA SCREEN DOC REV: CPT | Performed by: INTERNAL MEDICINE

## 2022-08-15 PROCEDURE — G8417 CALC BMI ABV UP PARAM F/U: HCPCS | Performed by: INTERNAL MEDICINE

## 2022-08-15 PROCEDURE — 1036F TOBACCO NON-USER: CPT | Performed by: INTERNAL MEDICINE

## 2022-08-15 PROCEDURE — G8427 DOCREV CUR MEDS BY ELIG CLIN: HCPCS | Performed by: INTERNAL MEDICINE

## 2022-08-15 RX ORDER — POLYETHYLENE GLYCOL 3350, SODIUM CHLORIDE, SODIUM BICARBONATE, POTASSIUM CHLORIDE 420; 11.2; 5.72; 1.48 G/4L; G/4L; G/4L; G/4L
4000 POWDER, FOR SOLUTION ORAL ONCE
Qty: 4000 ML | Refills: 0 | Status: SHIPPED | OUTPATIENT
Start: 2022-08-15 | End: 2022-08-15

## 2022-08-15 RX ORDER — FENOFIBRATE 145 MG/1
145 TABLET, COATED ORAL DAILY
COMMUNITY

## 2022-08-15 ASSESSMENT — ENCOUNTER SYMPTOMS
RESPIRATORY NEGATIVE: 1
GASTROINTESTINAL NEGATIVE: 1
EYES NEGATIVE: 1

## 2022-08-15 NOTE — PROGRESS NOTES
[address]  [address]    Chief Complaint   Patient presents with    New Patient     Pre-visit colonoscopy-no active gi issues, hx of colon polyps-last colonoscopy 2019         HPI    Mr. Lalitha Infante is a 57year old man with a history of emphysema, diabetes mellitus II, hypertension who presents for colon polyp surveillance. Family history of colon cancer: No  Blood in stool: No  Unintentional weight loss: No  Abdominal pain: No  Prior colonoscopy: Yes  Constipation history: No  Number of bowel movements a day: 1-2  Change in stool caliber: No      Colonoscopy 08/26/2019  1: SIGMOID COLON POLYP (A)   SIGMOID COLON POLYP\" 1.9 x 1.9 x 1.0 cm brown polypoid   fragment and separate irregular tan fragments, 0.8 x 0.5 x 0.2 cm in   aggregate. The largest fragment is trisected. Entirely 1cs. tm   SIGMOID COLON, BIOPSIES:        -  TUBULOVILLOUS ADENOMA. Past Medical History:   Diagnosis Date    Arthritis     COPD (chronic obstructive pulmonary disease) ([facility] [address]     Diabetes mellitus ([facility] [address]     Herniated cervical disc     Hyperlipidemia     Hypertension          Past Surgical History:   Procedure Laterality Date    ANKLE FRACTURE SURGERY Left     COLONOSCOPY      COLONOSCOPY N/A 8/26/2019    -polyps(tubulovillous adenomas)    TONSILLECTOMY           Current Outpatient Medications   Medication Sig Dispense Refill    empagliflozin (JARDIANCE) 25 MG tablet Take 25 mg by mouth in the morning. fenofibrate (TRICOR) 145 MG tablet Take 145 mg by mouth in the morning.       albuterol sulfate HFA (PROVENTIL;VENTOLIN;PROAIR) 108 (90 Base) MCG/ACT inhaler Inhale 2 puffs into the lungs 3 times daily as needed for Wheezing      fluticasone (FLONASE) 50 MCG/ACT nasal spray 1 spray by Each Nostril route 2 times daily      montelukast (SINGULAIR) 10 MG tablet Take 10 mg by mouth nightly      metFORMIN (GLUCOPHAGE) 1000 MG tablet Take 1,000 mg by mouth 2 times daily (with meals) Dulaglutide 1.5 MG/0.5ML SOPN Inject 1.5 mg into the skin once a week      simvastatin (ZOCOR) 20 MG tablet Take 20 mg by mouth daily      losartan (COZAAR) 100 MG tablet Take 100 mg by mouth daily      glimepiride (AMARYL) 4 MG tablet Take 4 mg by mouth 2 times daily      amLODIPine (NORVASC) 5 MG tablet Take 5 mg by mouth daily      ibuprofen (ADVIL;MOTRIN) 800 MG tablet Take 800 mg by mouth every 6 hours as needed for Pain       No current facility-administered medications for this visit. History reviewed. No pertinent family history. Social Determinants of Health     Tobacco Use: Low Risk     Smoking Tobacco Use: Never    Smokeless Tobacco Use: Never   Alcohol Use: Not on file   Financial Resource Strain: Not on file   Food Insecurity: Not on file   Transportation Needs: Not on file   Physical Activity: Not on file   Stress: Not on file   Social Connections: Not on file   Intimate Partner Violence: Not on file   Depression: Not on file   Housing Stability: Not on file       Review of Systems   Constitutional: Negative. HENT: Negative. Eyes: Negative. Respiratory: Negative. COPD   Cardiovascular:         Hypertension   Gastrointestinal: Negative. Endocrine:        DM II  Hyperlipidemia   Genitourinary: Negative. Musculoskeletal: Negative. Neurological: Negative. Hematological: Negative. Psychiatric/Behavioral: Negative. /72 (Site: Left Upper Arm, Position: Sitting, Cuff Size: Medium Adult)   Pulse 65   Temp 98.5 °F (36.9 °C) (Temporal)   Resp 20   Ht 5' 6\" (1.676 m)   Wt 199 lb 11.2 oz (90.6 kg)   BMI 32.23 kg/m²     Physical Exam  Constitutional:       Appearance: He is obese. HENT:      Head: Normocephalic. Nose: Nose normal.      Mouth/Throat:      Mouth: Mucous membranes are moist.   Eyes:      Pupils: Pupils are equal, round, and reactive to light. Cardiovascular:      Rate and Rhythm: Normal rate. Pulses: Normal pulses. Heart sounds: Normal heart sounds. Pulmonary:      Effort: Pulmonary effort is normal.   Abdominal:      General: Bowel sounds are normal.      Palpations: Abdomen is soft. Comments: Girth noted   Musculoskeletal:         General: Normal range of motion. Cervical back: Normal range of motion. Skin:     General: Skin is warm. Neurological:      General: No focal deficit present. Mental Status: He is alert and oriented to person, place, and time. Psychiatric:         Mood and Affect: Mood normal.      Ref Range & Units 4/25/22 1540   Sodium 134 - 146 mmol/L 140    Potassium, Bld 3.5 - 5.0 mmol/L 4.1    Chloride 98 - 109 mmol/L 105    CO2 22 - 32 mmol/L 25    Anion gap 5 - 15 mmol/L 10    BUN 5 - 27 mg/dL 22    Creatinine 0.60 - 1.30 mg/dL 0.96    Comment: METHOD TRACEABLE TO IDMS STANDARD   Glucose 65 - 99 mg/dL 131 High     Calcium 8.5 - 10.5 mg/dL 9.2    Total Protein 6.0 - 8.0 g/dL 7.4    Albumin 3.2 - 5.3 g/dL 4.1    Alkaline Phosphatase 39 - 130 U/L 76    AST 0 - 41 U/L 20    ALT 0 - 40 U/L 31    Total bilirubin 0.3 - 1.2 mg/dL 0.3    GFR MDRD Non Af Amer >59 ml/min/1.73sq.m >60    GFR MDRD Af Amer >59 ml/min/1.73sq.m >60    [place]ing Agency  [facility]   Specimen Collected: 04/25/22 15:40 Last Resulted: 04/25/22 20:12   Received From: [address] [place] [address]  Result Received: 08/15/22 14:41      Ref Range & Units 10/26/20 1611   White Blood Cells 4.0 - 11.0 X10E9/L 8.4    RBC count 4.10 - 5.70 X10E12/L 5.22    Hemoglobin 13.0 - 17.0 g/dL 14.4    Hematocrit 39 - 49 % 43.2    MCV 80 - 100 fL 83    MCH 27 - 34 pg 27.6    MCHC 32 - 36 g/dL 33.3    RDW 11.5 - 15.0 % 13.7    Platelets 992 - 991 V01G5/E 226        Assessment    Mr. Rubén Bartlett is a 57year old man with a history of emphysema, diabetes mellitus II, hypertension who presents for colon polyp surveillance. Tubulovillous adenoma was noted during his last colonoscopy in 08/2020. He is in need of colon surveillance. Mallampati score 2, ASA 3. He is at increased risk for colon cancer. Plan      1. Colon cancer screening  - polyethylene glycol-electrolytes (NULYTELY) 420 g solution; Take 4,000 mLs by mouth once for 1 dose  Dispense: 4000 mL; Refill: 0  - COLONOSCOPY (Screening); Future    2. Additional recommendations based on findings. Informed consent was obtained with a discussion about potential risks and complications of the procedure. Patient verbalized understanding and willingness to continue with the procedure scheduling. Spent 20 minutes with the patient with greater than 50 percent of the time was spent on face-to-face time in discussion with the patient regarding diagnostic options/results, treatment options, counseling, and follow-up plan.       Eduin Caba MD

## 2022-08-15 NOTE — PATIENT INSTRUCTIONS
SURVEY:    You may be receiving a survey from [employer] regarding your visit today. Please complete the survey to enable us to provide the highest quality of care to you and your family. If you cannot score us a very good on any question, please call the office to discuss how we could have made your experience a very good one. Thank you.

## 2022-09-28 ENCOUNTER — HOSPITAL ENCOUNTER (OUTPATIENT)
Age: 62
Discharge: HOME | End: 2022-09-28
Payer: COMMERCIAL

## 2022-09-28 DIAGNOSIS — Z01.818 PRE-OP TESTING: ICD-10-CM

## 2022-09-28 LAB
EKG ATRIAL RATE: 70 BPM
EKG P AXIS: 44 DEGREES
EKG P-R INTERVAL: 186 MS
EKG Q-T INTERVAL: 420 MS
EKG QRS DURATION: 140 MS
EKG QTC CALCULATION (BAZETT): 453 MS
EKG R AXIS: 109 DEGREES
EKG T AXIS: 37 DEGREES
EKG VENTRICULAR RATE: 70 BPM

## 2022-09-28 PROCEDURE — 93010 ELECTROCARDIOGRAM REPORT: CPT | Performed by: INTERNAL MEDICINE

## 2022-09-28 PROCEDURE — 93005 ELECTROCARDIOGRAM TRACING: CPT

## 2022-10-04 ENCOUNTER — ANESTHESIA EVENT (OUTPATIENT)
Dept: OPERATING ROOM | Age: 62
End: 2022-10-04
Payer: COMMERCIAL

## 2022-10-05 ENCOUNTER — ANESTHESIA (OUTPATIENT)
Dept: OPERATING ROOM | Age: 62
End: 2022-10-05
Payer: COMMERCIAL

## 2022-10-05 ENCOUNTER — HOSPITAL ENCOUNTER (OUTPATIENT)
Age: 62
Setting detail: OUTPATIENT SURGERY
Discharge: HOME | End: 2022-10-05
Attending: INTERNAL MEDICINE | Admitting: INTERNAL MEDICINE
Payer: COMMERCIAL

## 2022-10-05 VITALS
HEART RATE: 65 BPM | HEIGHT: 66 IN | TEMPERATURE: 97.4 F | DIASTOLIC BLOOD PRESSURE: 86 MMHG | SYSTOLIC BLOOD PRESSURE: 112 MMHG | BODY MASS INDEX: 30.53 KG/M2 | RESPIRATION RATE: 16 BRPM | WEIGHT: 190 LBS | OXYGEN SATURATION: 95 %

## 2022-10-05 DIAGNOSIS — K63.5 POLYP OF COLON, UNSPECIFIED PART OF COLON, UNSPECIFIED TYPE: ICD-10-CM

## 2022-10-05 LAB — GLUCOSE BLD-MCNC: 101 MG/DL (ref 74–100)

## 2022-10-05 PROCEDURE — 2709999900 HC NON-CHARGEABLE SUPPLY: Performed by: INTERNAL MEDICINE

## 2022-10-05 PROCEDURE — 45380 COLONOSCOPY AND BIOPSY: CPT | Performed by: INTERNAL MEDICINE

## 2022-10-05 PROCEDURE — 3609010600 HC COLONOSCOPY POLYPECTOMY SNARE/COLD BIOPSY: Performed by: INTERNAL MEDICINE

## 2022-10-05 PROCEDURE — 6360000002 HC RX W HCPCS: Performed by: NURSE ANESTHETIST, CERTIFIED REGISTERED

## 2022-10-05 PROCEDURE — 7100000010 HC PHASE II RECOVERY - FIRST 15 MIN: Performed by: INTERNAL MEDICINE

## 2022-10-05 PROCEDURE — 7100000011 HC PHASE II RECOVERY - ADDTL 15 MIN: Performed by: INTERNAL MEDICINE

## 2022-10-05 PROCEDURE — 2500000003 HC RX 250 WO HCPCS: Performed by: NURSE ANESTHETIST, CERTIFIED REGISTERED

## 2022-10-05 PROCEDURE — 2580000003 HC RX 258: Performed by: NURSE ANESTHETIST, CERTIFIED REGISTERED

## 2022-10-05 PROCEDURE — 82947 ASSAY GLUCOSE BLOOD QUANT: CPT

## 2022-10-05 PROCEDURE — 3700000001 HC ADD 15 MINUTES (ANESTHESIA): Performed by: INTERNAL MEDICINE

## 2022-10-05 PROCEDURE — 88305 TISSUE EXAM BY PATHOLOGIST: CPT

## 2022-10-05 PROCEDURE — 3700000000 HC ANESTHESIA ATTENDED CARE: Performed by: INTERNAL MEDICINE

## 2022-10-05 PROCEDURE — A4216 STERILE WATER/SALINE, 10 ML: HCPCS | Performed by: NURSE ANESTHETIST, CERTIFIED REGISTERED

## 2022-10-05 RX ORDER — SODIUM CHLORIDE 9 MG/ML
INJECTION INTRAVENOUS PRN
Status: DISCONTINUED | OUTPATIENT
Start: 2022-10-05 | End: 2022-10-05 | Stop reason: SDUPTHER

## 2022-10-05 RX ORDER — SODIUM CHLORIDE, SODIUM LACTATE, POTASSIUM CHLORIDE, CALCIUM CHLORIDE 600; 310; 30; 20 MG/100ML; MG/100ML; MG/100ML; MG/100ML
INJECTION, SOLUTION INTRAVENOUS CONTINUOUS
Status: DISCONTINUED | OUTPATIENT
Start: 2022-10-05 | End: 2022-10-05 | Stop reason: HOSPADM

## 2022-10-05 RX ORDER — LIDOCAINE HYDROCHLORIDE 20 MG/ML
INJECTION, SOLUTION EPIDURAL; INFILTRATION; INTRACAUDAL; PERINEURAL PRN
Status: DISCONTINUED | OUTPATIENT
Start: 2022-10-05 | End: 2022-10-05 | Stop reason: SDUPTHER

## 2022-10-05 RX ORDER — PHENYLEPHRINE HYDROCHLORIDE 10 MG/ML
INJECTION INTRAVENOUS PRN
Status: DISCONTINUED | OUTPATIENT
Start: 2022-10-05 | End: 2022-10-05 | Stop reason: SDUPTHER

## 2022-10-05 RX ORDER — PROPOFOL 10 MG/ML
INJECTION, EMULSION INTRAVENOUS PRN
Status: DISCONTINUED | OUTPATIENT
Start: 2022-10-05 | End: 2022-10-05 | Stop reason: SDUPTHER

## 2022-10-05 RX ORDER — COVID-19 ANTIGEN TEST
1 KIT MISCELLANEOUS 2 TIMES DAILY
COMMUNITY

## 2022-10-05 RX ORDER — PROPOFOL 10 MG/ML
INJECTION, EMULSION INTRAVENOUS CONTINUOUS PRN
Status: DISCONTINUED | OUTPATIENT
Start: 2022-10-05 | End: 2022-10-05 | Stop reason: SDUPTHER

## 2022-10-05 RX ADMIN — SODIUM CHLORIDE 20 ML: 9 INJECTION INTRAMUSCULAR; INTRAVENOUS; SUBCUTANEOUS at 10:57

## 2022-10-05 RX ADMIN — SODIUM CHLORIDE, POTASSIUM CHLORIDE, SODIUM LACTATE AND CALCIUM CHLORIDE: 600; 310; 30; 20 INJECTION, SOLUTION INTRAVENOUS at 09:51

## 2022-10-05 RX ADMIN — PHENYLEPHRINE HYDROCHLORIDE 150 MCG: 10 INJECTION INTRAVENOUS at 10:57

## 2022-10-05 RX ADMIN — PROPOFOL 80 MG: 10 INJECTION, EMULSION INTRAVENOUS at 10:54

## 2022-10-05 RX ADMIN — PHENYLEPHRINE HYDROCHLORIDE 150 MCG: 10 INJECTION INTRAVENOUS at 11:18

## 2022-10-05 RX ADMIN — PHENYLEPHRINE HYDROCHLORIDE 150 MCG: 10 INJECTION INTRAVENOUS at 11:00

## 2022-10-05 RX ADMIN — PHENYLEPHRINE HYDROCHLORIDE 200 MCG: 10 INJECTION INTRAVENOUS at 11:06

## 2022-10-05 RX ADMIN — PHENYLEPHRINE HYDROCHLORIDE 150 MCG: 10 INJECTION INTRAVENOUS at 11:10

## 2022-10-05 RX ADMIN — PHENYLEPHRINE HYDROCHLORIDE 150 MCG: 10 INJECTION INTRAVENOUS at 10:58

## 2022-10-05 RX ADMIN — LIDOCAINE HYDROCHLORIDE 80 MG: 20 INJECTION, SOLUTION EPIDURAL; INFILTRATION; INTRACAUDAL; PERINEURAL at 10:54

## 2022-10-05 RX ADMIN — PROPOFOL 150 MCG/KG/MIN: 10 INJECTION, EMULSION INTRAVENOUS at 10:56

## 2022-10-05 RX ADMIN — PHENYLEPHRINE HYDROCHLORIDE 200 MCG: 10 INJECTION INTRAVENOUS at 11:02

## 2022-10-05 ASSESSMENT — COPD QUESTIONNAIRES: CAT_SEVERITY: MODERATE

## 2022-10-05 NOTE — DISCHARGE INSTRUCTIONS
Next colonoscopy in 5-7 years depending on pathology. SAME DAY SURGERY DISCHARGE INSTRUCTIONS    1. Do not drive or operate hazardous machinery for 24 hours. 2.  Do not make important personal or business decisions for 24 hours. 3.  Do not drink alcoholic beverages for 24 hours. 4.  Do not smoke tobacco products for 24 hours. 5.  Eat light foods (Jell-O, soups, etc....) and drink plenty of fluids (water, Sprite, etc...) up to 8 glasses per day, as you can tolerate. 6.  Limit your activities for 24 hours. Do not engage in heavy work until your surgeon gives you permission. 7.  Call your surgeon for any questions regarding your surgery. COLONOSCOPY DISCHARGE INSTRUCTIONS:    It's normal to have a feeling of fullness or mild cramping in your abdomen afterwards due to air that is put into your bowel during the procedure. Mild activities such as walking will help you pass the air. You may resume your regular diet. You will receive a phone call with your test results in 2 weeks. If you have not received a phone call in 2 weeks please call the office for your results. CALL THE DOCTOR IF YOU HAVE:     Chest pain or trouble breathing. Bleeding from your rectum, vomiting or spitting up of blood that is more than a few streaks or red or black stools    A fever above 101F or if you have chills    Pain that is worse or different than any pain you had before the procedure    Nausea or vomiting that lasts for more than 2 hours. If symptoms are to severe call 911 or go to the nearest Emergency Room.

## 2022-10-05 NOTE — PROGRESS NOTES
Pt verbalized readiness to go home. Discharge instructions given to pt and wife. Verbalized understanding and all questions answered at this time. Discharge Criteria    Inpatients must meet Criteria 1 through 7. All other patients are either YES or N/A. If a NO is chosen then Anesthesia or Surgeon must be notified. 1.  Minimum 30 minutes after last dose of sedative medication, minimum 120 minutes after last dose of reversal agent. Yes      2. Systolic BP stable within 20 mmHg for 30 minutes & systolic BP between 90 & 145 or within 10 mmHg of baseline. Yes      3. Pulse between 60 and 100 or within 10 bpm of baseline. Yes      4. Spontaneous respiratory rate >/= 10 per minute. Yes      5. SaO2 >/= 95 or  >/= baseline. Yes      6. Able to cough and swallow or return to baseline function. Yes      7. Alert and oriented or return to baseline mental status. Yes      8. Demonstrates controlled, coordinated movements, ambulates with steady gait, or return to baseline activity function. Yes      9. Minimal or no pain or nausea, or at a level tolerable and acceptable to patient. Yes      10. Takes and retains oral fluids as allowed. Yes      11. Procedural / perioperative site stable. Minimal or no bleeding. Yes          12. If GI endoscopy procedure, minimal or no abdominal distention or passing flatus. Yes      13. Written discharge instructions and emergency telephone number provided. Yes      14. Accompanied by a responsible adult.     Yes

## 2022-10-05 NOTE — ANESTHESIA POSTPROCEDURE EVALUATION
Department of Anesthesiology  Postprocedure Note    Patient: Cherylle Boast  MRN: [id number]  YOB: 1960  Date of evaluation: 10/5/2022      Procedure Summary     Date: 10/05/22 Room / Location: [address] [place] [address]    Anesthesia Start: 4105 Anesthesia Stop: 8872    Procedure: COLONOSCOPY POLYPECTOMY SNARE/COLD BIOPSY (Abdomen) Diagnosis:       Polyp of colon, unspecified part of colon, unspecified type      (COLON POLYPS, SCREENING)    Surgeons: Lyric Quezada MD Responsible Provider: JOHN Messina CRNA    Anesthesia Type: TIVA ASA Status: 3          Anesthesia Type: No value filed.     Vero Phase I: Vero Score: 10    Vero Phase II: Vero Score: 10      Anesthesia Post Evaluation    Patient location during evaluation: PACU  Patient participation: complete - patient participated  Level of consciousness: awake and alert  Pain score: 0  Airway patency: patent  Nausea & Vomiting: no nausea and no vomiting  Complications: no  Cardiovascular status: blood pressure returned to baseline  Respiratory status: acceptable and room air  Hydration status: stable

## 2022-10-05 NOTE — OP NOTE
[facility]    COLONOSCOPY    PROCEDURE DATE: 10/05/22    REFERRING PHYSICIAN: No ref. provider found     PRIMARY CARE PROVIDER: Jaymie Delgado MD    ATTENDING PHYSICIAN: Amilcar Paez MD     HISTORY: Mr. Lucia Chandler is a 57 y.o. male who presents to the  endoscopy unit for colonoscopy. The patient's clinical history is remarkable for hyperlipidemia. He is currently medically stable and appropriate for the planned procedure. PREOPERATIVE DIAGNOSIS: screening for colon cancer. PROCEDURES:   Transanal Colonoscopy --screening. POSTPROCEDURE DIAGNOSIS:  2 sessile polyps in cecum    MEDICATIONS: MAC per anesthesia     EBL 2 ml      INSTRUMENT: Olympus CF-H190 AL Pediatric flexible Colonoscope. PREPARATION: The nature and character of the procedure as well as risks, benefits, and alternatives were discussed with the patient and informed consent was obtained. Complications were said to include, but were not limited to: medication allergy, medication reaction, cardiovascular and respiratory problems, bleeding, perforation, infection, and/or missed diagnosis. Following arrival in the endoscopy room, the patient was placed in the left lateral decubitus position and final time-out accomplished in the presence of the nursing staff. Baseline vital signs were obtained and reviewed, and IV sedation was subsequently initiated. FINDINGS: Rectal examination demonstrated no significant visible external abnormality and digital palpation was unremarkable. Following adequate conscious sedation the colonoscope was introduced and advanced under direct visualization to the cecum, which was identified by the ileocecal valve and appendiceal orifice. The bowel preparation was felt to be fair - corn and seeds in cecum and right colon -improved with scope powered irrigation and suction. Cecal intubation time was 5 minutes.      Once maximally inserted, the endoscope was withdrawn and the mucosa was carefully inspected. The mucosal exam revealed 4 mm, 4 mm sessile polyps removed from cecum with cold biopsy forceps. Retroflexion was performed in the rectum and no internal hemorrhoids. Withdrawal time was 16 minutes. IMPRESSION:   Colon polyps     RECOMMENDATIONS:   1) Follow up with referring provider, as previously scheduled. 2) Repeat Colonoscopy in likely in 5 or 7 years. Await pathology report       Electronically signed by Maria Bill MD on 10/5/2022 at 11:31 AM       The patient was counseled at length about the risks of ivonne Covid-19 during their perioperative period and any recovery window from their procedure. The patient was made aware that ivonne Covid-19  may worsen their prognosis for recovering from their procedure  and lend to a higher morbidity and/or mortality risk. All material risks, benefits, and reasonable alternatives including postponing the procedure were discussed. The patient DOES wish to proceed with the procedure at this time.

## 2022-10-05 NOTE — ANESTHESIA PRE PROCEDURE
Department of Anesthesiology  Preprocedure Note       Name:  Nino Wolf   Age:  57 y.o.  :  1960                                          MRN:  [id number]         Date:  10/5/2022      Surgeon: Theodora Polanco):  Mary Rodriguez MD    Procedure: Procedure(s):  COLORECTAL CANCER SCREENING, NOT HIGH RISK    Medications prior to admission:   Prior to Admission medications    Medication Sig Start Date End Date Taking? Authorizing Provider   Naproxen Sodium (ALEVE) 220 MG CAPS Take 1 tablet by mouth in the morning and at bedtime   Yes Historical Provider, MD   empagliflozin (JARDIANCE) 25 MG tablet Take 25 mg by mouth in the morning. Historical Provider, MD   fenofibrate (TRICOR) 145 MG tablet Take 145 mg by mouth in the morning.     Historical Provider, MD   albuterol sulfate HFA (PROVENTIL;VENTOLIN;PROAIR) 108 (90 Base) MCG/ACT inhaler Inhale 2 puffs into the lungs 3 times daily as needed for Wheezing    Historical Provider, MD   fluticasone (FLONASE) 50 MCG/ACT nasal spray 1 spray by Each Nostril route 2 times daily    Historical Provider, MD   montelukast (SINGULAIR) 10 MG tablet Take 10 mg by mouth nightly    Historical Provider, MD   metFORMIN (GLUCOPHAGE) 1000 MG tablet Take 1,000 mg by mouth 2 times daily (with meals)    Historical Provider, MD   Dulaglutide 1.5 MG/0.5ML SOPN Inject 1.5 mg into the skin once a week    Historical Provider, MD   simvastatin (ZOCOR) 20 MG tablet Take 20 mg by mouth daily    Historical Provider, MD   losartan (COZAAR) 100 MG tablet Take 100 mg by mouth daily    Historical Provider, MD   glimepiride (AMARYL) 4 MG tablet Take 4 mg by mouth 2 times daily    Historical Provider, MD   amLODIPine (NORVASC) 5 MG tablet Take 5 mg by mouth daily    Historical Provider, MD   ibuprofen (ADVIL;MOTRIN) 800 MG tablet Take 800 mg by mouth every 6 hours as needed for Pain    Historical Provider, MD       Current medications:    Current Facility-Administered Medications   Medication Dose Route Frequency Provider Last Rate Last Admin    lactated ringers infusion   IntraVENous Continuous JOHN Segal - CRNA 100 mL/hr at 10/05/22 0951 New Bag at 10/05/22 0951       Allergies: Allergies   Allergen Reactions    Insulins Rash       Problem List:    Patient Active Problem List   Diagnosis Code   (none) - all problems resolved or deleted       Past Medical History:        Diagnosis Date    Arthritis     [facility]     Diabetes mellitus ([facility]     Herniated cervical disc     Hyperlipidemia     Hypertension        Past Surgical History:        Procedure Laterality Date    ANKLE FRACTURE SURGERY Left     COLONOSCOPY      COLONOSCOPY N/A 08/26/2019    -polyps(tubulovillous adenomas)    NECK SURGERY      TONSILLECTOMY         Social History:    Social History     Tobacco Use    Smoking status: Never    Smokeless tobacco: Never   Substance Use Topics    Alcohol use: Yes     Comment: socially                                Counseling given: Not Answered      Vital Signs (Current):   Vitals:    09/27/22 1340 10/05/22 0945   BP:  (!) 143/91   Pulse:  64   Resp:  15   Temp:  36.1 °C (97 °F)   TempSrc:  Temporal   SpO2:  94%   Weight: 190 lb (86.2 kg)    Height: 5' 6\" (1.676 m)                                               BP Readings from Last 3 Encounters:   10/05/22 (!) 143/91   08/15/22 108/72   08/26/19 127/85       NPO Status: Time of last liquid consumption: 0500                        Time of last solid consumption: 0800 (States he doesn't remember what he ate.)                        Date of last liquid consumption: 10/05/22                        Date of last solid food consumption: 10/03/22    BMI:   Wt Readings from Last 3 Encounters:   09/27/22 190 lb (86.2 kg)   08/15/22 199 lb 11.2 oz (90.6 kg)   08/26/19 218 lb (98.9 kg)     Body mass index is 30.67 kg/m².     CBC: No results found for: WBC, RBC, HGB, HCT, MCV, RDW, PLT    CMP: No results found for: NA, K, CL, CO2, BUN, CREATININE, GFRAA, AGRATIO, LABGLOM, GLUCOSE, GLU, PROT, CALCIUM, BILITOT, ALKPHOS, AST, ALT    POC Tests:   Recent Labs     10/05/22  0951   POCGLU 101*       Coags: No results found for: PROTIME, INR, APTT    HCG (If Applicable): No results found for: PREGTESTUR, PREGSERUM, HCG, HCGQUANT     ABGs: No results found for: PHART, PO2ART, DBS7DAA, IHT8SFJ, BEART, V3BKVQZH     Type & Screen (If Applicable):  No results found for: LABABO, LABRH    Drug/Infectious Status (If Applicable):  No results found for: HIV, HEPCAB    COVID-19 Screening (If Applicable):   Lab Results   Component Value Date/Time    COVID19 NOT-DETECTED 11/16/2020 12:40 PM    COVID19 Not Detected 06/18/2020 11:53 AM           Anesthesia Evaluation  Patient summary reviewed and Nursing notes reviewed no history of anesthetic complications:   Airway: Mallampati: II  TM distance: >3 FB   Neck ROM: full  Mouth opening: > = 3 FB   Dental:    (+) lower dentures and upper dentures      Pulmonary:normal exam    (+) COPD: moderate,                             Cardiovascular:    (+) hypertension: moderate, hyperlipidemia                  Neuro/Psych:   Negative Neuro/Psych ROS              GI/Hepatic/Renal:   (+) GERD: no interval change, bowel prep,          ROS comment: \"have heartburn all the time\". Endo/Other:    (+) DiabetesType II DM, no interval change, , .                 Abdominal:             Vascular: negative vascular ROS. Other Findings:           Anesthesia Plan      TIVA     ASA 3       Induction: intravenous. Anesthetic plan and risks discussed with patient.                         JOHN Leyva - CRNA   10/5/2022

## 2022-10-05 NOTE — H&P
History and Physical    Patient's Name/Date of Birth: Lucia Chandler / 1960 (83 y.o.)    MRN: [id number]     Date: October 5, 2022       CHIEF COMPLAINT:  screening for colon cancer      Mr. Hernando Gonzalez is a 57year old man with a history of emphysema, diabetes mellitus II, hypertension who presents for colon polyp surveillance. Family history of colon cancer: No  Blood in stool: No  Unintentional weight loss: No  Abdominal pain: No  Prior colonoscopy: Yes  Constipation history: No  Number of bowel movements a day: 1-2  Change in stool caliber: No        Colonoscopy 08/26/2019  1: SIGMOID COLON POLYP (A)   SIGMOID COLON POLYP\" 1.9 x 1.9 x 1.0 cm brown polypoid   fragment and separate irregular tan fragments, 0.8 x 0.5 x 0.2 cm in   aggregate. The largest fragment is trisected. Entirely 1cs. tm   SIGMOID COLON, BIOPSIES:        -  TUBULOVILLOUS ADENOMA. Past Medical History:   Diagnosis Date    Arthritis     COPD (chronic obstructive [facility] disease) ([facility] [facility]     Diabetes mellitus ([facility] [address] [facility]     Herniated cervical disc     Hyperlipidemia     Hypertension      Past Surgical History:   Procedure Laterality Date    ANKLE FRACTURE SURGERY Left     COLONOSCOPY      COLONOSCOPY N/A 08/26/2019    -polyps(tubulovillous adenomas)    NECK SURGERY      TONSILLECTOMY       Current Facility-Administered Medications   Medication Dose Route Frequency Provider Last Rate Last Admin    lactated ringers infusion   IntraVENous Continuous JOHN Allison - CRNA 100 mL/hr at 10/05/22 0951 New Bag at 10/05/22 0951     Allergies   Allergen Reactions    Insulins Rash     History reviewed. No pertinent family history.   Social History     Socioeconomic History    Marital status:      Spouse name: Not on file    Number of children: Not on file    Years of education: Not on file    Highest education level: Not on file   Occupational History    Not on file   Tobacco Use    Smoking status: Never    Smokeless tobacco: Never Vaping Use    Vaping Use: Never used   Substance and Sexual Activity    Alcohol use: Yes     Comment: socially    Drug use: Never    Sexual activity: Not on file   Other Topics Concern    Not on file   Social History Narrative    Not on file     Social Determinants of Health     Financial Resource Strain: Not on file   Food Insecurity: Not on file   Transportation Needs: Not on file   Physical Activity: Not on file   Stress: Not on file   Social Connections: Not on file   Intimate Partner Violence: Not on file   Housing Stability: Not on file     ROS: Non-contributory    Physical Exam:  Vitals:    10/05/22 0945   BP: (!) 143/91   Pulse: 64   Resp: 15   Temp: 97 °F (36.1 °C)   SpO2: 94%       Chest: Breath sounds were clear and equal with no rales, wheezes, or rhonchi. Respiratory effort was normal with no retractions or use of accessory muscles. Cardiovascular: Heart sounds were normal with a regular rate and rhythm. There were no murmurs, gallops or rubs. Abdomen: Bowel sounds were normal.  The abdomen was soft and non distended. There was no tenderness, guarding, rebound, or rigidity. There were no masses, hepatosplenomegaly, or hernias. Assessment    Mr. Cal Hobbs is a 57year old man with a history of emphysema, diabetes mellitus II, hypertension who presents for colon polyp surveillance. Tubulovillous adenoma was noted during his last colonoscopy in 08/2020. He is in need of colon surveillance. Mallampati score 2, ASA 3. He is at increased risk for colon cancer. Plan       1. Colon cancer screening  - Colonoscopy      VERIFICATION OF CONSENT    The patient was counseled regarding the procedure, its indications, risks, potential complications and alternatives. Any questions were answered.  Consent was obtained    Electronically signed by Rachael Medina MD on 10/5/2022 at 10:35 AM

## 2022-10-07 LAB — SURGICAL PATHOLOGY REPORT: NORMAL

## 2024-04-03 NOTE — PROGRESS NOTES
health and behavior change through goalsetting, behavior change strategies aimed at risk reduction  [] Special situations--disaster planning, travel, social activities  [] Foot, skin, and dental care    Session Assessment & Evaluation Ratings:  1=Needs Instruction  2=Needs Review  3=Comprehends Key Points  4=Demonstrates Understanding/Competency  NC=Not Covered   N/A=Not Applicable Initial  Assess    Date:   2nd  Visit     Date:   3rd  Visit    Date: 4th  Visit    Date: Comments  S.O.C=Stage of Change/Readiness to change:  Pre=Pre-contemplation stage--not thinking about changing  C=Contemplation stage--ambivalent about changing  P=Preparation stage--prepared to made a specific change  A=Action stage--committed to modify behaviors  M=Maintenance and relapse prevention--incorporating new behavior   Participant Stated  Goal      I will eat more consistent and not go more than 5 hours without eating. Participant Stated Goal  I will consider adding exercise to daily routine and select an activity by next appointment     S. O.C  [] PRE  [x] C  [] P      [] A  [] M                    S.O.C  [x] PRE  [] C  [] P      [] A  [] M     S.O.C  [] PRE  [x] C  [] P      [] A  [] M                     S.O.C  [] PRE  [x] C  [] P      [] A  [] M      S.O.C  [] PRE  [] C  [] P      [] A  [] M                    S.O.C  [] PRE  [] C  [] P      [] A  [] M     S.O.C  [] PRE  [] C  [] P      [] A  [] M                    S.O.C  [] PRE  [] C  [] P      [] A  [] M   Current main goal attainment frequency:   [] Never  [] Some  [] Half  [x] Most  [] All    Participant confidence to master goal this visit:   [] Excellent  [x] Good  [] Vida Found  [] Poor            Current main goal attainment frequency:   [] Never  [x] Some  [] Half  [] Most  [] All    Participant confidence to master goal this visit:   [] Excellent  [x] Good [] Fair  [] Poor                  Session  Topics & Learning Objectives:      Comments:   Diabetes disease process & Treatment process: Define diabetes & prediabetes; identify own type of diabetes; role of the pancreas; signs/symptoms; diagnostic criteria; prevention and treatment options; contributing factors. Rating  [x] 1  [] 2  [] 3  [] 4      [] NC  [] N/A   Rating  [] 1  [] 2  [x] 3  [] 4  [] NC  [] N/A     Rating  [] 1  [] 2  [] 3  [] 4  [] NC  [] N/A     Rating  [] 1  [] 2  [] 3  [] 4  [] NC  [] N/A      07/14/21-States role of pancreas and liver in glucose control. Verbalizes understanding of insulin resistance and medication and lifestyle are treatment options. Incorporating nutritional management into lifestyle: Describe effect of type, amount & timing of food on blood glucose; Describe basic meal planning techniques & current nutrition guidelines; Correctly read food labels & demonstrate CHO counting & portion control with personalized meal plan. Rating  [x] 1  [] 2  [] 3  [] 4  [] NC  [] N/A     Rating  [] 1  [x] 2  [] 3  [] 4  [] NC  [] N/A     Rating  [] 1  [] 2  [] 3  [] 4  [] NC  [] N/A     Rating  [] 1  [] 2  [] 3  [] 4  [] NC  [] N/A                 Incorporating physical activity into lifestyle:   State effect of exercise on blood glucose levels. Identifies personal exercise plan and contraindications. Discussed safety tips while exercising. Rating  [x] 1  [] 2  [] 3  [] 4  [] NC  [] N/A     Rating  [] 1  [x] 2  [] 3  [] 4  [] NC  [] N/A       Rating  [] 1  [] 2  [] 3  [] 4  [] NC  [] N/A     Rating  [] 1  [] 2  [] 3  [] 4  [] NC  [] N/A           Using medications safely: State effect of diabetes medicines on diabetes;   Name diabetes medication taking, action, timing & side effects.    Rating  [x] 1  [] 2  [] 3  [] 4  [] NC  [] N/A       Rating  [x] 1  [] 2  [] 3  [] 4  [] NC  [] N/A         Rating  [] 1  [] 2  [] 3  [] 4  [] NC  [] N/A   Rating  [] 1  [] 2  [] 3  [] 4  [] NC  [] N/A      ________             Insulin/injectable-  Appropriate injection site; proper storage; proper technique; safe needle disposal.   Rating  [x] 1  [] 2  [] 3  [] 4  [] NC  [] N/A Rating  [x] 1  [] 2  [] 3  [] 4  [] NC  [] N/A Rating  [] 1  [] 2  [] 3  [] 4  [] NC  [] N/A Rating  [] 1  [] 2  [] 3  [] 4  [] NC  [] N/A        Monitoring blood glucose, interpreting and using results: Identify recommended blood glucose targets, personal targets, A1C target, importance of logging glucose,appropriate techniques and problem solving. Safe lancet disposal.    Rating  [x] 1  [] 2  [] 3  [] 4  [] NC  [] N/A     Rating  [x] 1  [] 2  [] 3  [] 4  [] NC  [] N/A       Rating  [] 1  [] 2  [] 3  [] 4  [] NC  [] N/A     Rating  [] 1  [] 2  [] 3  [] 4  [] NC  [] N/A         Prevention, detection & treatment of acute complications: List symptoms of hyper- and hypoglycemia; Describe how to treat low blood sugar & actions for lowering high blood glucose levels. Prevention and treatment strategies. Rating  [x] 1  [] 2  [] 3  [] 4  [] NC  [] N/A   Rating  [x] 1  [] 2  [] 3  [] 4  [] NC  [] N/A   Rating  [] 1  [] 2  [] 3  [] 4  [] NC  [] N/A Rating  [] 1  [] 2  [] 3  [] 4  [] NC  [] N/A                   Describe sick day guidelines and indications for physician notification. Rating  [x] 1  [] 2  [] 3  [] 4  [] NC  [] N/A     Rating  [x] 1  [] 2  [] 3  [] 4  [] NC  [] N/A     Rating  [] 1  [] 2  [] 3  [] 4  [] NC  [] N/A     Rating  [] 1  [] 2  [] 3  [] 4  [] NC  [] N/A        Prevention, detection & treatment of chronic complications: Define the natural course of diabetes & describe the relationship of blood glucose levels to long term complications of diabetes. Identify preventative measures and standard of care.      Rating  [x] 1  [] 2  [] 3  [] 4  [] NC  [] N/A     Rating  [x] 1  [] 2  [] 3  [] 4  [] NC  [] N/A     Rating  [] 1  [] 2  [] 3  [] 4  [] NC  [] N/A     Rating  [] 1  [] 2  [] 3  [] 4  [] NC  [] N/A      Developing strategies to address psychosocial issues: Describe feelings about living with diabetes; Identify support needed & support network. Describe how stress, depression, and anxiety affect blood glucose. Identify coping strategies. Rating  [x] 1  [] 2  [] 3  [] 4  [] NC  [] N/A       Rating  [] 1  [x] 2  [] 3  [] 4  [] NC  [] N/A     Rating  [] 1  [] 2  [] 3  [] 4  [] NC  [] N/A     Rating  [] 1  [] 2  [] 3  [] 4  [] NC  [] N/A       07/14/21-Verbalizes feelings of being overwhelmed, frustrated, and in denial.  Wife is supportive. Developing strategies to promote health/change behavior:  Define the ABCs of diabetes; Identify appropriate screenings, schedule & personal plan for screenings. Identify 7 self-care behaviors. Benefits, challenges and strategies for behavioral change. Rating  [x] 1  [] 2  [] 3  [] 4  [] NC  [] N/A     Rating  [x] 1  [] 2  [] 3  [] 4  [] NC  [] N/A     Rating  [] 1  [] 2  [] 3  [] 4  [] NC  [] N/A     Rating  [] 1  [] 2  [] 3  [] 4  [] NC  [] N/A            Time spent with patient: [id number]    Next Appointment: 2 weeks     Instruction Method: [x]Lecture/Discussion  []Power Point Presentation  [x]Handouts  []Return Demonstration     Education Materials/Equipment Provided:      [x]Self-Management - Initial assessment - ADA  Where do I Begin? Living with Type 2 diabetes\" booklet;  Diet meal planning basics, handout on diabetes education classes, hyper/hypoglycemia signs/symptoms and treatment handout; Diabetes zones; Support plan resource list.      []Self-Management  Class 1 - Diabetes: Your Take Control Guide\" booklet. Healthy Interactions [facility] \"On The Road To Better Managing Your Diabetes\". [] Self-Management  Class 2 -  \"Traveling with Diabetes\", Dining Out With Diabetes, \"Coping with Diabetes\", \"Diabetes Disaster Planning\", \"Know Your Numbers/Diabetes Care Checklist\", [address] [place] [address] Blood Sugar, Low Blood Sugar. Healthy Interactions Map \"Monitoring Your Blood Glucose. \"     [] Self-Management  Class 3 - \"Risk Factors for CVD\", foot care tips sheet and \"How to pick the right shoe\", \"Medications Used To Treat Diabetes\", How To Care For Your Teeth and Gums\", \"Vaccinations For Adults with Diabetes\", \"Type 2 diabetes and the role of GLP-1\". Healthy Interactions Map \"Continuing Your Journey\". []Self-Management - 3 month follow-up      []Glucose Meter      []Insulin Kit      []Other        Handouts/Booklets given:     [] Diabetes-Your Take Control Guide   [] Daily Diabetic Meal Planning Guide   [] Nutrition in the [facility]    [] Resources for People With Diabetes   [] Other       Diabetes Self Management Support Plan: To assist and support your continued progress in managing your diabetes following education-    ( X )  Gym or exercise program of your choice. (Suggestions:  [employer], Circuit training, any exercise facility and your local Physical Therapy or Cardiac Rehabilitation exercise Program)      (  )   Library    (  )    ADA website:  [address].ca. org    (  )   Http: [employer]    (  )   Diabetes Forecast [place] you may get this information on the ADA web site.     (  )  Diabetes Interview - [place]   [phone]    (  )  Diabetes Self Management (bi-monthly magazine)  [phone]    (  ) Support group: (  ) Support group: [facility] first Tuesday of the month at 9 am and [facility] third Wednesday of the month at 9 am    (  )  [employer] [employer]  (relaxation tapes for people with Diabetes)  [phone]    Colleen Smith  Your suggestions:   Ophthalmology Consult                     Christiano Moran RN, BSN, Khari Dominguez PROVIDER:[TOKEN:[[id number]],SCHEDULEDAPPT:[04/10/2024],SCHEDULEDAPPTTIME:[08:40 AM]]

## 2024-10-15 ENCOUNTER — HOSPITAL ENCOUNTER (OUTPATIENT)
Age: 64
Discharge: HOME | End: 2024-10-15
Payer: COMMERCIAL

## 2024-10-15 LAB
ALBUMIN SERPL-MCNC: 3.9 G/DL (ref 3.5–5.2)
ALBUMIN/GLOB SERPL: 1.4 {RATIO} (ref 1–2.5)
ALP SERPL-CCNC: 69 U/L (ref 40–129)
ALT SERPL-CCNC: 24 U/L (ref 10–50)
ANION GAP SERPL CALCULATED.3IONS-SCNC: 13 MMOL/L (ref 9–16)
AST SERPL-CCNC: 19 U/L (ref 10–50)
BILIRUB SERPL-MCNC: <0.2 MG/DL (ref 0–1.2)
BUN SERPL-MCNC: 28 MG/DL (ref 8–23)
BUN/CREAT SERPL: 22 (ref 9–20)
CALCIUM SERPL-MCNC: 9.7 MG/DL (ref 8.6–10.4)
CHLORIDE SERPL-SCNC: 100 MMOL/L (ref 98–107)
CO2 SERPL-SCNC: 21 MMOL/L (ref 20–31)
CREAT SERPL-MCNC: 1.3 MG/DL (ref 0.7–1.2)
GFR, ESTIMATED: 59 ML/MIN/1.73M2
GLUCOSE SERPL-MCNC: 322 MG/DL (ref 74–99)
POTASSIUM SERPL-SCNC: 4.6 MMOL/L (ref 3.7–5.3)
PROT SERPL-MCNC: 6.7 G/DL (ref 6.6–8.7)
SODIUM SERPL-SCNC: 134 MMOL/L (ref 136–145)

## 2024-10-15 PROCEDURE — 36415 COLL VENOUS BLD VENIPUNCTURE: CPT

## 2024-10-15 PROCEDURE — 80053 COMPREHEN METABOLIC PANEL: CPT

## (undated) DEVICE — CANNULA ORAL NSL AD CO2 N INTUB O2 DEL DISP TRU LNK

## (undated) DEVICE — TUBING SUCT NON-STRL 9/32X100 W/CNNT

## (undated) DEVICE — SOLUTION IV IRRIG POUR BRL 0.9% SODIUM CHL 2F7124

## (undated) DEVICE — NET RETRV SPECIMEN 160CM 3MM ROTH NET

## (undated) DEVICE — CLIP HEMSTAS L2300MM DIA275MM OPNING 11MM OPN CLOSE

## (undated) DEVICE — TRAP SURG QUAD PARABOLA SLOT DSGN SFTY SCRN TRAPEASE

## (undated) DEVICE — ELECTRODE ES AD CRD L15FT DISP FOR PT BELOW 30LB REM

## (undated) DEVICE — ACUSNARE POLYPECTOMY SNARE: Brand: ACUSNARE

## (undated) DEVICE — MEDI-VAC NON-CONDUCTIVE TUBING7MM X 30.5 (100FT): Brand: CARDINAL HEALTH